# Patient Record
Sex: FEMALE | Race: WHITE | NOT HISPANIC OR LATINO | Employment: OTHER | URBAN - METROPOLITAN AREA
[De-identification: names, ages, dates, MRNs, and addresses within clinical notes are randomized per-mention and may not be internally consistent; named-entity substitution may affect disease eponyms.]

---

## 2018-04-22 ENCOUNTER — OFFICE VISIT (OUTPATIENT)
Dept: URGENT CARE | Facility: CLINIC | Age: 63
End: 2018-04-22
Payer: MEDICARE

## 2018-04-22 VITALS
HEIGHT: 67 IN | WEIGHT: 185 LBS | SYSTOLIC BLOOD PRESSURE: 114 MMHG | HEART RATE: 102 BPM | OXYGEN SATURATION: 94 % | RESPIRATION RATE: 18 BRPM | BODY MASS INDEX: 29.03 KG/M2 | TEMPERATURE: 99.2 F | DIASTOLIC BLOOD PRESSURE: 70 MMHG

## 2018-04-22 DIAGNOSIS — J40 BRONCHITIS: ICD-10-CM

## 2018-04-22 PROCEDURE — 99203 OFFICE O/P NEW LOW 30 MIN: CPT | Performed by: PHYSICIAN ASSISTANT

## 2018-04-22 RX ORDER — AZITHROMYCIN 250 MG/1
TABLET, FILM COATED ORAL
Qty: 6 TAB | Refills: 0 | Status: SHIPPED | OUTPATIENT
Start: 2018-04-22 | End: 2023-07-05

## 2018-04-22 RX ORDER — METHYLPREDNISOLONE 4 MG/1
TABLET ORAL
Qty: 1 KIT | Refills: 0 | Status: SHIPPED | OUTPATIENT
Start: 2018-04-22 | End: 2023-07-05

## 2018-04-22 ASSESSMENT — ENCOUNTER SYMPTOMS
SORE THROAT: 1
NECK PAIN: 0
DIARRHEA: 0
HEADACHES: 0
RHINORRHEA: 1
COUGH: 1

## 2018-04-22 ASSESSMENT — PAIN SCALES - GENERAL: PAINLEVEL: 8=MODERATE-SEVERE PAIN

## 2018-04-22 NOTE — PROGRESS NOTES
"Subjective:      Nadine Hurd is a 63 y.o. female who presents with Otalgia (x3 days. Pt complains of Lt ear pain, fever, post nasal drip, sinus congestion/pressure.)            Otalgia    There is pain in the left ear. This is a new problem. The current episode started yesterday. The problem occurs constantly. The problem has been gradually worsening. The maximum temperature recorded prior to her arrival was 101 - 101.9 F. The fever has been present for less than 1 day. The pain is at a severity of 5/10. The pain is moderate. Associated symptoms include coughing, rhinorrhea and a sore throat. Pertinent negatives include no diarrhea, ear discharge, headaches, hearing loss, neck pain or rash. She has tried nothing for the symptoms. The treatment provided no relief.     Past medical history: Is not pertinent to this examination  Past surgical history: Not pertinent to this examination  Family history: Is not pertinent to this examination  Allergies: No known drug allergies  Social history: Is reviewed in Epic      Review of Systems   HENT: Positive for ear pain, rhinorrhea and sore throat. Negative for ear discharge and hearing loss.    Respiratory: Positive for cough.    Gastrointestinal: Negative for diarrhea.   Musculoskeletal: Negative for neck pain.   Skin: Negative for rash.   Neurological: Negative for headaches.          Objective:     /70   Pulse (!) 102   Temp 37.3 °C (99.2 °F)   Resp 18   Ht 1.702 m (5' 7\")   Wt 83.9 kg (185 lb)   SpO2 94%   Breastfeeding? No   BMI 28.98 kg/m²      Physical Exam       Gen.: Patient is A and O ×3, no acute distress, well-nourished well-hydrated  Vitals: Are listed and unremarkable  HEENT: Heads normocephalic, atraumatic, PERRLA, extraocular movements intact, TMs and oropharynx clear  Neck: Soft supple without cervical lymphadenopathy  Cardiovascular: Regular rate and rhythm normal S1 and S2. No murmurs, rubs or gallops  Lungs are clear to auscultation " bilaterally. no wheezes rales or rhonchi. Mildly decreased breath sounds in the left lower lung  Abdomen is soft, nontender, nondistended with good bowel sounds, no hepatosplenomegaly  Skin: Is well perfused without evidence of rash or lesions  Neurological:  cranial nerves II through XII were assessed and intact.  Musculoskeletal: Full range of motion, 5 out of 5 strength against resistance  Neurovascularly: Intact with a 2 second cap refill, good distal pulses    Urgent care course: Offered albuterol breathing treatment. Patient defers     Assessment/Plan:     1. Bronchitis  Discussed likely viral etiology. Ears look good. Patient requesting Z-Maxwell. Discussed only fill prescription if symptoms persist or worsen despite treatment  - azithromycin (ZITHROMAX) 250 MG Tab; Take two tablets on day one, then on tablet the following four days  Dispense: 6 Tab; Refill: 0  - MethylPREDNISolone (MEDROL DOSEPAK) 4 MG Tablet Therapy Pack; Take daily according to directions on the packet  Dispense: 1 Kit; Refill: 0

## 2021-03-03 DIAGNOSIS — Z23 NEED FOR VACCINATION: ICD-10-CM

## 2021-09-10 ENCOUNTER — HOSPITAL ENCOUNTER (OUTPATIENT)
Dept: LAB | Facility: MEDICAL CENTER | Age: 66
End: 2021-09-10
Attending: GENERAL PRACTICE
Payer: MEDICARE

## 2021-09-10 LAB
25(OH)D3 SERPL-MCNC: 51 NG/ML (ref 30–100)
ALBUMIN SERPL BCP-MCNC: 4 G/DL (ref 3.2–4.9)
ALBUMIN/GLOB SERPL: 1.4 G/DL
ALP SERPL-CCNC: 78 U/L (ref 30–99)
ALT SERPL-CCNC: 18 U/L (ref 2–50)
AMORPH CRY #/AREA URNS HPF: PRESENT /HPF
ANION GAP SERPL CALC-SCNC: 10 MMOL/L (ref 7–16)
APPEARANCE UR: ABNORMAL
AST SERPL-CCNC: 24 U/L (ref 12–45)
BACTERIA #/AREA URNS HPF: NEGATIVE /HPF
BASOPHILS # BLD AUTO: 0.8 % (ref 0–1.8)
BASOPHILS # BLD: 0.05 K/UL (ref 0–0.12)
BILIRUB SERPL-MCNC: 0.7 MG/DL (ref 0.1–1.5)
BILIRUB UR QL STRIP.AUTO: NEGATIVE
BUN SERPL-MCNC: 12 MG/DL (ref 8–22)
CALCIUM SERPL-MCNC: 9.1 MG/DL (ref 8.5–10.5)
CHLORIDE SERPL-SCNC: 108 MMOL/L (ref 96–112)
CHOLEST SERPL-MCNC: 177 MG/DL (ref 100–199)
CO2 SERPL-SCNC: 25 MMOL/L (ref 20–33)
COLOR UR: YELLOW
CREAT SERPL-MCNC: 0.68 MG/DL (ref 0.5–1.4)
CRP SERPL HS-MCNC: <0.3 MG/DL (ref 0–0.75)
EOSINOPHIL # BLD AUTO: 0.24 K/UL (ref 0–0.51)
EOSINOPHIL NFR BLD: 3.9 % (ref 0–6.9)
EPI CELLS #/AREA URNS HPF: NEGATIVE /HPF
ERYTHROCYTE [DISTWIDTH] IN BLOOD BY AUTOMATED COUNT: 46.3 FL (ref 35.9–50)
EST. AVERAGE GLUCOSE BLD GHB EST-MCNC: 108 MG/DL
FASTING STATUS PATIENT QL REPORTED: NORMAL
GLOBULIN SER CALC-MCNC: 2.8 G/DL (ref 1.9–3.5)
GLUCOSE SERPL-MCNC: 90 MG/DL (ref 65–99)
GLUCOSE UR STRIP.AUTO-MCNC: NEGATIVE MG/DL
HBA1C MFR BLD: 5.4 % (ref 4–5.6)
HCT VFR BLD AUTO: 45.2 % (ref 37–47)
HDLC SERPL-MCNC: 58 MG/DL
HGB BLD-MCNC: 14.8 G/DL (ref 12–16)
HYALINE CASTS #/AREA URNS LPF: NORMAL /LPF
IMM GRANULOCYTES # BLD AUTO: 0.02 K/UL (ref 0–0.11)
IMM GRANULOCYTES NFR BLD AUTO: 0.3 % (ref 0–0.9)
IRON SATN MFR SERPL: 46 % (ref 15–55)
IRON SERPL-MCNC: 109 UG/DL (ref 40–170)
KETONES UR STRIP.AUTO-MCNC: NEGATIVE MG/DL
LDLC SERPL CALC-MCNC: 97 MG/DL
LEUKOCYTE ESTERASE UR QL STRIP.AUTO: NEGATIVE
LYMPHOCYTES # BLD AUTO: 1.73 K/UL (ref 1–4.8)
LYMPHOCYTES NFR BLD: 28.1 % (ref 22–41)
MAGNESIUM SERPL-MCNC: 2.1 MG/DL (ref 1.5–2.5)
MCH RBC QN AUTO: 31.1 PG (ref 27–33)
MCHC RBC AUTO-ENTMCNC: 32.7 G/DL (ref 33.6–35)
MCV RBC AUTO: 95 FL (ref 81.4–97.8)
MICRO URNS: ABNORMAL
MONOCYTES # BLD AUTO: 0.39 K/UL (ref 0–0.85)
MONOCYTES NFR BLD AUTO: 6.3 % (ref 0–13.4)
NEUTROPHILS # BLD AUTO: 3.72 K/UL (ref 2–7.15)
NEUTROPHILS NFR BLD: 60.6 % (ref 44–72)
NITRITE UR QL STRIP.AUTO: NEGATIVE
NRBC # BLD AUTO: 0 K/UL
NRBC BLD-RTO: 0 /100 WBC
PH UR STRIP.AUTO: 8 [PH] (ref 5–8)
PLATELET # BLD AUTO: 244 K/UL (ref 164–446)
PMV BLD AUTO: 10.2 FL (ref 9–12.9)
POTASSIUM SERPL-SCNC: 4.2 MMOL/L (ref 3.6–5.5)
PROT SERPL-MCNC: 6.8 G/DL (ref 6–8.2)
PROT UR QL STRIP: NEGATIVE MG/DL
RBC # BLD AUTO: 4.76 M/UL (ref 4.2–5.4)
RBC # URNS HPF: NORMAL /HPF
RBC UR QL AUTO: NEGATIVE
RENAL EPI CELLS #/AREA URNS HPF: NORMAL /HPF
SODIUM SERPL-SCNC: 143 MMOL/L (ref 135–145)
SP GR UR STRIP.AUTO: 1.02
T3 SERPL-MCNC: 120 NG/DL (ref 60–181)
T3FREE SERPL-MCNC: 2.76 PG/ML (ref 2–4.4)
T4 SERPL-MCNC: 8.8 UG/DL (ref 4–12)
TIBC SERPL-MCNC: 237 UG/DL (ref 250–450)
TRIGL SERPL-MCNC: 112 MG/DL (ref 0–149)
TSH SERPL DL<=0.005 MIU/L-ACNC: 1.09 UIU/ML (ref 0.38–5.33)
UIBC SERPL-MCNC: 128 UG/DL (ref 110–370)
UROBILINOGEN UR STRIP.AUTO-MCNC: 0.2 MG/DL
WBC # BLD AUTO: 6.2 K/UL (ref 4.8–10.8)
WBC #/AREA URNS HPF: NORMAL /HPF

## 2021-09-10 PROCEDURE — 81001 URINALYSIS AUTO W/SCOPE: CPT

## 2021-09-10 PROCEDURE — 85025 COMPLETE CBC W/AUTO DIFF WBC: CPT

## 2021-09-10 PROCEDURE — 84480 ASSAY TRIIODOTHYRONINE (T3): CPT

## 2021-09-10 PROCEDURE — 36415 COLL VENOUS BLD VENIPUNCTURE: CPT

## 2021-09-10 PROCEDURE — 84443 ASSAY THYROID STIM HORMONE: CPT

## 2021-09-10 PROCEDURE — 83735 ASSAY OF MAGNESIUM: CPT

## 2021-09-10 PROCEDURE — 82306 VITAMIN D 25 HYDROXY: CPT

## 2021-09-10 PROCEDURE — 80061 LIPID PANEL: CPT

## 2021-09-10 PROCEDURE — 84439 ASSAY OF FREE THYROXINE: CPT

## 2021-09-10 PROCEDURE — 84436 ASSAY OF TOTAL THYROXINE: CPT

## 2021-09-10 PROCEDURE — 86140 C-REACTIVE PROTEIN: CPT

## 2021-09-10 PROCEDURE — 80053 COMPREHEN METABOLIC PANEL: CPT

## 2021-09-10 PROCEDURE — 83036 HEMOGLOBIN GLYCOSYLATED A1C: CPT | Mod: GA

## 2021-09-10 PROCEDURE — 84481 FREE ASSAY (FT-3): CPT

## 2021-09-10 PROCEDURE — 83540 ASSAY OF IRON: CPT

## 2021-09-10 PROCEDURE — 83550 IRON BINDING TEST: CPT

## 2021-09-14 LAB — T4 FREE SERPL DIALY-MCNC: 2.1 NG/DL (ref 1.1–2.4)

## 2023-06-02 ENCOUNTER — HOSPITAL ENCOUNTER (OUTPATIENT)
Dept: RADIOLOGY | Facility: MEDICAL CENTER | Age: 68
End: 2023-06-02
Attending: STUDENT IN AN ORGANIZED HEALTH CARE EDUCATION/TRAINING PROGRAM
Payer: COMMERCIAL

## 2023-06-02 ENCOUNTER — HOSPITAL ENCOUNTER (OUTPATIENT)
Dept: RADIOLOGY | Facility: MEDICAL CENTER | Age: 68
End: 2023-06-02
Payer: COMMERCIAL

## 2023-06-02 ENCOUNTER — HOSPITAL ENCOUNTER (OUTPATIENT)
Dept: RADIOLOGY | Facility: MEDICAL CENTER | Age: 68
End: 2023-06-02
Attending: FAMILY MEDICINE
Payer: COMMERCIAL

## 2023-06-09 ENCOUNTER — APPOINTMENT (OUTPATIENT)
Dept: RADIOLOGY | Facility: MEDICAL CENTER | Age: 68
End: 2023-06-09
Attending: SURGERY
Payer: MEDICARE

## 2023-06-09 DIAGNOSIS — N63.10 MASS OF RIGHT BREAST, UNSPECIFIED QUADRANT: ICD-10-CM

## 2023-06-09 PROCEDURE — A9579 GAD-BASE MR CONTRAST NOS,1ML: HCPCS | Performed by: SURGERY

## 2023-06-09 PROCEDURE — 700117 HCHG RX CONTRAST REV CODE 255: Performed by: SURGERY

## 2023-06-09 PROCEDURE — C8908 MRI W/O FOL W/CONT, BREAST,: HCPCS

## 2023-06-09 RX ADMIN — GADOTERIDOL 15 ML: 279.3 INJECTION, SOLUTION INTRAVENOUS at 13:28

## 2023-06-21 ENCOUNTER — HOSPITAL ENCOUNTER (OUTPATIENT)
Facility: MEDICAL CENTER | Age: 68
End: 2023-06-21
Attending: SURGERY | Admitting: SURGERY
Payer: MEDICARE

## 2023-06-23 ENCOUNTER — APPOINTMENT (OUTPATIENT)
Dept: ADMISSIONS | Facility: MEDICAL CENTER | Age: 68
End: 2023-06-23
Attending: SURGERY
Payer: MEDICARE

## 2023-07-05 ENCOUNTER — PRE-ADMISSION TESTING (OUTPATIENT)
Dept: ADMISSIONS | Facility: MEDICAL CENTER | Age: 68
End: 2023-07-05
Attending: SURGERY
Payer: MEDICARE

## 2023-07-05 DIAGNOSIS — Z01.812 PRE-OPERATIVE LABORATORY EXAMINATION: ICD-10-CM

## 2023-07-05 RX ORDER — LEVOTHYROXINE SODIUM 0.07 MG/1
1 TABLET ORAL DAILY
COMMUNITY

## 2023-07-05 RX ORDER — MULTIVIT WITH MINERALS/LUTEIN
1 TABLET ORAL DAILY
COMMUNITY
End: 2023-12-26

## 2023-07-05 NOTE — OR NURSING
During RN telephone preadmission appointment, Nadine Hurd stated that she is Jehovah Witness and requests to be bloodless. I called Holly,  for Dr. Bello and left her a telephone voice message that Nadine requests to be bloodless. I also called and spoke with [facility] surgery scheduler Carolee Man and notified her that Nadine is bloodless. I scheduled Nadine for a testing appointment on 7/11/23 for a CBC and EKG and she will be given a consent to review for the bloodless program prior to surgery.

## 2023-10-04 ENCOUNTER — OFFICE VISIT (OUTPATIENT)
Dept: SURGERY | Facility: MEDICAL CENTER | Age: 68
End: 2023-10-04
Payer: MEDICARE

## 2023-10-04 DIAGNOSIS — N60.91 ATYPICAL LOBULAR HYPERPLASIA (ALH) OF RIGHT BREAST: ICD-10-CM

## 2023-10-04 DIAGNOSIS — D48.61 NEOPLASM OF UNCERTAIN BEHAVIOR OF RIGHT BREAST: ICD-10-CM

## 2023-10-04 DIAGNOSIS — Z80.3 FAMILY HISTORY OF MALIGNANT NEOPLASM OF BREAST: ICD-10-CM

## 2023-10-04 PROCEDURE — 99203 OFFICE O/P NEW LOW 30 MIN: CPT | Performed by: SURGERY

## 2023-10-04 RX ORDER — ALPRAZOLAM 0.5 MG/1
TABLET ORAL
COMMUNITY
End: 2023-10-04

## 2023-10-04 RX ORDER — DIAZEPAM 2 MG/1
TABLET ORAL
COMMUNITY
Start: 2023-07-13 | End: 2023-10-04

## 2023-10-04 RX ORDER — DOXYCYCLINE HYCLATE 100 MG/1
CAPSULE ORAL
COMMUNITY
End: 2023-10-04

## 2023-10-04 RX ORDER — CIPROFLOXACIN 250 MG/1
TABLET, FILM COATED ORAL
COMMUNITY
End: 2023-10-04

## 2023-10-04 RX ORDER — HYDROXYCHLOROQUINE SULFATE 200 MG/1
TABLET, FILM COATED ORAL
COMMUNITY
End: 2023-11-27

## 2023-10-04 NOTE — PROGRESS NOTES
Subjective:   10/4/2023  6:11 AM  Primary care provider:  Dru Conroy M.D.    Chief Complaint:   Chief Complaint   Patient presents with    New Patient       History of presenting illness:  This pleasant 68 y.o. year old female is kindly referred for consultation regarding a RIGHT breast mass and biopsy showing atypical lobular hyperplasia.  She noticed a right breast mass in December, 2022.  This apparently waxes and wanes, but has not definitely enlarged.  No nipple discharge or other changes on breast exam.  She reports some achiness of the breast.  She had a previous LEFT breast excisional biopsy done by Dr. Hart in his office 23 years ago.  She remembers being told it was benign but precancerous.      She explained to me the journey she has been on with workup of this breast mass.  She reports undergoing extensive biopsies of the right breast mass in Dr. Bello's office and recalls being told it was again benign but precancerous, and was advised to undergo further surgery.  She was actually scheduled for this, but then she developed strep throat which led to cancellation of the procedure.  On further reflection, she realized that she did not feel as connected with her surgeon as she desired, and wondered if there was hesitation on his part after discussing her firm decision to avoid blood products (Quaker).  She felt that perhaps a female surgeon might ease her concerns.    I reviewed the available records, and per documentation, pt had reported palpitations and near syncopal event with local anesthesia for a dental procedure, so percutaneous biopsy by Radiology was declined.  This led to the referral for surgical evaluation by Dr. Bello and 0.5% Marcaine with epinephrine was used which was felt to be tolerated reasonably well.      The patient is accompanied by her  Johan.      LABS:  Lab Results   Component Value Date/Time    HBA1C 5.4 09/10/2021 12:31 PM        IMAGING:  Screening  mammo 2/15/23 RDC:   Scattered FGD.  Last prior mammo 2016.   RIGHT upper outer focal asymmetry.    Right diagnostic mammo 4/6/23:   10:00 focal AD with corresponding sonographic mass.    Right breast US:   10:00, 5cm FN:  Hypoechoic well defined area with posterior shadowing corresponding to mammo AD.  2.8 x 3.9 x 1.3cm.     No axillary LAD.    Core biopsies done by Dr. Bello 4/25/23:  Focal atypical lobular hyperplasia ([facility])       MRI:  MR-BREAST-BILATERAL-WITH & W/O 06/09/2023    Narrative  6/9/2023 11:34 AM    HISTORY/REASON FOR EXAM:  New architectural distortion in the upper outer quadrant of the right breast with apparent ultrasound correlate felt to be highly suspicious for breast cancer. Patient reportedly underwent ultrasound-guided biopsy by Dr. Bello with pathology reportedly showing atypical lobular hyperplasia.      TECHNIQUE/EXAM DESCRIPTION:  MRI of the breast, bilateral without and with dynamic IV gadolinium enhancement.    MR imaging of the bilateral breasts was performed on a Demetra 3.0 Blaire scanner using a dedicated breast coil with the patient in the prone position, with axial T1, axial fat-suppressed T2, and bolus dynamic intravenous gadolinium enhanced fat-suppressed 3D GRE sequences at precontrast, 30 second, 2 minute, 3 minute and 5 minute delays. Post processing subtraction images were obtained. Delayed postcontrast sagittal images also obtained. 15 mL ProHance contrast was administered intravenously.    COMPARISON:  Mammogram and ultrasound April 6, 2023, mammogram February 15, 2023 and mammogram June 7, 2016    FINDINGS:  Background parenchymal enhancement is mild and symmetric.    Right breast: There are scattered fibroglandular densities. No obvious cysts identified. No lymphadenopathy identified. There is an area of architectural distortion/ill-defined asymmetry in the upper-outer quadrant of the right breast posterior third corresponding with mammographic  findings. This measures approximately 15 x 14 x 16 mm. There is minimal associated enhancement. No other abnormality identified.    Left breast: There are scattered fibroglandular densities noted. No lymphadenopathy identified. There is no active enhancing mass or abnormal area of enhancement.    Upper abdomen: Apparent small to moderate-sized hiatal hernia.    Bony structures: Unremarkable    Impression  1.  Architectural distortion with minimal associated enhancement identified in the upper-outer quadrant of the right breast corresponding with mammographic findings. Differential diagnosis includes radial scar and carcinoma. If excision is not planned, then would recommend stereotactic core biopsy to ensure adequate sampling of the area which was originally identified on mammography and better visualized on mammography.    2.  No MRI evidence of abnormality in the left breast.    R4C - CATEGORY 4C: SUSPICIOUS FINDING - FINDING NEEDING INTERVENTION WITH A MODERATE CONCERN BUT NOT CLASSIC FOR MALIGNANCY      Allergies   Allergen Reactions    Other Environmental Anaphylaxis     Bees    Bloodless Unspecified     Jehovah witness    Ceclor [Cefaclor] Anaphylaxis    Clindamycin Anaphylaxis    Codeine Nausea     Nausea and vomiting    Epinephrine Unspecified     tachycardia    Food Dye Swelling     Throat swells    Penicillins Itching    Zinc Swelling       Current Outpatient Medications   Medication Sig    levothyroxine (EUTHYROX) 75 MCG Tab Take 1 Tablet by mouth every day.    Cholecalciferol (VITAMIN D3 PO) Take 5,000 Int'l Units by mouth every day.    Ascorbic Acid (VITAMIN C) 1000 MG Tab Take 1 Tablet by mouth every day.    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     Past Surgical History:   Procedure Laterality Date    COLONOSCOPY  "     LUMPECTOMY Left     OTHER      Right salivary tumor removal    OVARIAN CYSTECTOMY      Laparoscopic    TONSILLECTOMY         Social History     Tobacco Use    Smoking status: Never     Passive exposure: Past    Smokeless tobacco: Never   Vaping Use    Vaping Use: Never used   Substance Use Topics    Alcohol use: Never    Drug use: Never     Family and Occupational History     Socioeconomic History    Marital status:      Spouse name: Not on file    Number of children: Not on file    Years of education: Not on file    Highest education level: Not on file   Occupational History    Not on file      OB History    Para Term  AB Living   4 1 0 0 3 0   SAB IAB Ectopic Molar Multiple Live Births   0 0 0 0 0 0   Obstetric Comments   Age of first delivery: 40       Family History   Problem Relation Age of Onset    Colon Cancer Mother     Breast Cancer Paternal Aunt     Colon Cancer Maternal Grandmother     Rectal Cancer Maternal Grandfather     Uterine cancer Maternal Cousin     Uterine cancer Maternal Cousin        Review of Systems   HENT:  Positive for congestion.    Eyes:         Nearsighted.  Cataracts.   Cardiovascular:         Mitral valve prolapse   Gastrointestinal:         Hiatal hernia   Skin:         Right breast lump   All other systems reviewed and are negative.         Objective:   /82 (BP Location: Left arm, Patient Position: Sitting, BP Cuff Size: Large adult)   Pulse 62   Temp 36.3 °C (97.3 °F) (Temporal)   Ht 1.702 m (5' 7\")   Wt 83.8 kg (184 lb 12.8 oz)   LMP 10/05/2013 (Approximate) Comment: Age of first period: 9  SpO2 94%   BMI 28.94 kg/m²       Physical Exam  Constitutional:       Appearance: Normal appearance.   Cardiovascular:      Rate and Rhythm: Regular rhythm.      Heart sounds: Normal heart sounds.   Pulmonary:      Effort: Pulmonary effort is normal.      Breath sounds: Normal breath sounds.   Skin:     Comments: See scanned breast diagram   Neurological: "      Mental Status: She is alert.   Psychiatric:         Mood and Affect: Mood normal.      Comments: Anxiety related to medical condition             Diagnosis:     1. 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 Bello's office).    2cm on exam.  Vague sonographic finding measured to be almost 4cm.  Mammo shows a discrete asymmetry/AD 1.6cm.      Screening mammo 2/15/23 RDC:  Scattered FGD.     Paternal aunt with breast cancer (50).  Other FH for colon and rectal cancer, uterine cancer, brother with brain tumor.  Refer to Genetics.      No smoking or alcohol history.  BMI 29.  [employer].  Refuses blood products.      DISCUSSED:  I personally reviewed and independently interpreted her breast imaging including mammograms and US, available pathology reports, and relevant outside records.  I explained that historically, we have excised lesions such as these due to reported upgrade rates to malignancy, which can require additional surgery or other treatment.   I discussed the significance of ALH and how this can typically be simply monitored in certain settings.  However, this mass is palpable and moderately suspicious based on imaging and exam.  I therefore agree with excision.  We discussed the potential surgical risks including bleeding, infection, and visible scars and/or other aesthetic changes.  To limit her risk for bleeding, we are in agreement to simply make a direct incision over the palpable mass, as she had done on the left breast.  She assures me she is unconcerned about cosmetic outcome or asymmetry.  I also explained the importance of compression for 2-3 weeks after surgery and advised her to obtain some snug-fitting compression/sports bras to be worn immediately after surgery.        I advised  consultation with Genetics.  We discussed the role of more comprehensive genetic testing focused on cancer related gene mutations, and I advised consultation with genetic counselor along with post-test counseling to ensure she has adequate understanding of her test results and/or screening options.  She may qualify for high risk screening.       Total time spent today, including personal review and independent interpretation of available breast imaging and pathology reports, extensive review of outside records, face to face time (25 min for this alone), chart documentation, and  was 40 minutes.            PLAN:  Excisional biopsy of RIGHT breast mass.    Referral to Genetics

## 2023-10-05 VITALS
WEIGHT: 184.8 LBS | TEMPERATURE: 97.3 F | HEART RATE: 62 BPM | HEIGHT: 67 IN | BODY MASS INDEX: 29 KG/M2 | SYSTOLIC BLOOD PRESSURE: 128 MMHG | OXYGEN SATURATION: 94 % | DIASTOLIC BLOOD PRESSURE: 82 MMHG

## 2023-10-05 PROBLEM — D48.61 NEOPLASM OF UNCERTAIN BEHAVIOR OF RIGHT BREAST: Status: ACTIVE | Noted: 2023-10-05

## 2023-10-05 ASSESSMENT — PATIENT HEALTH QUESTIONNAIRE - PHQ9: CLINICAL INTERPRETATION OF PHQ2 SCORE: 0

## 2023-10-05 ASSESSMENT — ENCOUNTER SYMPTOMS
ROS GI COMMENTS: HIATAL HERNIA
ROS SKIN COMMENTS: RIGHT BREAST LUMP

## 2023-10-10 ENCOUNTER — DOCUMENTATION (OUTPATIENT)
Dept: SURGERY | Facility: MEDICAL CENTER | Age: 68
End: 2023-10-10
Payer: COMMERCIAL

## 2023-10-10 NOTE — PROGRESS NOTES
NO AUTH REQUIRED FOR CODE [id number] 11-2-23 SURGERY W/DR. MOREIRA. MEDICARE/[facility]

## 2023-10-12 ENCOUNTER — APPOINTMENT (OUTPATIENT)
Dept: ADMISSIONS | Facility: MEDICAL CENTER | Age: 68
End: 2023-10-12
Attending: OBSTETRICS & GYNECOLOGY
Payer: MEDICARE

## 2023-10-18 ENCOUNTER — APPOINTMENT (OUTPATIENT)
Dept: ADMISSIONS | Facility: MEDICAL CENTER | Age: 68
End: 2023-10-18
Attending: SURGERY
Payer: MEDICARE

## 2023-10-30 RX ORDER — ALPRAZOLAM 0.25 MG/1
.25-.5 TABLET ORAL
Status: CANCELLED | OUTPATIENT
Start: 2023-11-02 | End: 2023-11-03

## 2023-11-13 ENCOUNTER — APPOINTMENT (OUTPATIENT)
Dept: RADIOLOGY | Facility: MEDICAL CENTER | Age: 68
End: 2023-11-13
Attending: GENERAL PRACTICE
Payer: MEDICARE

## 2023-11-20 ENCOUNTER — PRE-ADMISSION TESTING (OUTPATIENT)
Dept: ADMISSIONS | Facility: MEDICAL CENTER | Age: 68
End: 2023-11-20
Attending: SURGERY
Payer: MEDICARE

## 2023-11-27 ENCOUNTER — PRE-ADMISSION TESTING (OUTPATIENT)
Dept: ADMISSIONS | Facility: MEDICAL CENTER | Age: 68
End: 2023-11-27
Payer: MEDICARE

## 2023-11-27 DIAGNOSIS — Z01.812 PRE-OPERATIVE LABORATORY EXAMINATION: ICD-10-CM

## 2023-11-28 ENCOUNTER — ANESTHESIA EVENT (OUTPATIENT)
Dept: SURGERY | Facility: MEDICAL CENTER | Age: 68
End: 2023-11-28
Payer: MEDICARE

## 2023-11-28 ENCOUNTER — ANESTHESIA (OUTPATIENT)
Dept: SURGERY | Facility: MEDICAL CENTER | Age: 68
End: 2023-11-28
Payer: MEDICARE

## 2023-11-28 ENCOUNTER — HOSPITAL ENCOUNTER (OUTPATIENT)
Facility: MEDICAL CENTER | Age: 68
End: 2023-11-28
Attending: SURGERY | Admitting: SURGERY
Payer: MEDICARE

## 2023-11-28 ENCOUNTER — APPOINTMENT (OUTPATIENT)
Dept: RADIOLOGY | Facility: MEDICAL CENTER | Age: 68
End: 2023-11-28
Attending: SURGERY
Payer: MEDICARE

## 2023-11-28 VITALS
OXYGEN SATURATION: 93 % | TEMPERATURE: 97.5 F | DIASTOLIC BLOOD PRESSURE: 65 MMHG | HEIGHT: 67 IN | SYSTOLIC BLOOD PRESSURE: 116 MMHG | BODY MASS INDEX: 29.1 KG/M2 | WEIGHT: 185.41 LBS | RESPIRATION RATE: 20 BRPM | HEART RATE: 81 BPM

## 2023-11-28 DIAGNOSIS — G89.18 POSTOPERATIVE PAIN: ICD-10-CM

## 2023-11-28 DIAGNOSIS — N63.10 MASS OF RIGHT BREAST, UNSPECIFIED QUADRANT: ICD-10-CM

## 2023-11-28 LAB
BASOPHILS # BLD AUTO: 0.8 % (ref 0–1.8)
BASOPHILS # BLD: 0.06 K/UL (ref 0–0.12)
EOSINOPHIL # BLD AUTO: 0.11 K/UL (ref 0–0.51)
EOSINOPHIL NFR BLD: 1.5 % (ref 0–6.9)
ERYTHROCYTE [DISTWIDTH] IN BLOOD BY AUTOMATED COUNT: 43.1 FL (ref 35.9–50)
HCT VFR BLD AUTO: 47.3 % (ref 37–47)
HGB BLD-MCNC: 15.6 G/DL (ref 12–16)
IMM GRANULOCYTES # BLD AUTO: 0.01 K/UL (ref 0–0.11)
IMM GRANULOCYTES NFR BLD AUTO: 0.1 % (ref 0–0.9)
LYMPHOCYTES # BLD AUTO: 1.64 K/UL (ref 1–4.8)
LYMPHOCYTES NFR BLD: 22.1 % (ref 22–41)
MCH RBC QN AUTO: 30.4 PG (ref 27–33)
MCHC RBC AUTO-ENTMCNC: 33 G/DL (ref 32.2–35.5)
MCV RBC AUTO: 92.2 FL (ref 81.4–97.8)
MONOCYTES # BLD AUTO: 0.46 K/UL (ref 0–0.85)
MONOCYTES NFR BLD AUTO: 6.2 % (ref 0–13.4)
NEUTROPHILS # BLD AUTO: 5.13 K/UL (ref 1.82–7.42)
NEUTROPHILS NFR BLD: 69.3 % (ref 44–72)
NRBC # BLD AUTO: 0 K/UL
NRBC BLD-RTO: 0 /100 WBC (ref 0–0.2)
PATHOLOGY CONSULT NOTE: NORMAL
PLATELET # BLD AUTO: 280 K/UL (ref 164–446)
PMV BLD AUTO: 9.7 FL (ref 9–12.9)
RBC # BLD AUTO: 5.13 M/UL (ref 4.2–5.4)
WBC # BLD AUTO: 7.4 K/UL (ref 4.8–10.8)

## 2023-11-28 PROCEDURE — 160028 HCHG SURGERY MINUTES - 1ST 30 MINS LEVEL 3: Performed by: SURGERY

## 2023-11-28 PROCEDURE — 700105 HCHG RX REV CODE 258: Performed by: ANESTHESIOLOGY

## 2023-11-28 PROCEDURE — 700111 HCHG RX REV CODE 636 W/ 250 OVERRIDE (IP): Mod: JZ | Performed by: ANESTHESIOLOGY

## 2023-11-28 PROCEDURE — 160039 HCHG SURGERY MINUTES - EA ADDL 1 MIN LEVEL 3: Performed by: SURGERY

## 2023-11-28 PROCEDURE — 700101 HCHG RX REV CODE 250: Performed by: ANESTHESIOLOGY

## 2023-11-28 PROCEDURE — A9270 NON-COVERED ITEM OR SERVICE: HCPCS | Performed by: ANESTHESIOLOGY

## 2023-11-28 PROCEDURE — 93005 ELECTROCARDIOGRAM TRACING: CPT | Performed by: SURGERY

## 2023-11-28 PROCEDURE — 160009 HCHG ANES TIME/MIN: Performed by: SURGERY

## 2023-11-28 PROCEDURE — 160035 HCHG PACU - 1ST 60 MINS PHASE I: Performed by: SURGERY

## 2023-11-28 PROCEDURE — 36415 COLL VENOUS BLD VENIPUNCTURE: CPT

## 2023-11-28 PROCEDURE — 160046 HCHG PACU - 1ST 60 MINS PHASE II: Performed by: SURGERY

## 2023-11-28 PROCEDURE — 700111 HCHG RX REV CODE 636 W/ 250 OVERRIDE (IP): Performed by: ANESTHESIOLOGY

## 2023-11-28 PROCEDURE — 700102 HCHG RX REV CODE 250 W/ 637 OVERRIDE(OP): Performed by: ANESTHESIOLOGY

## 2023-11-28 PROCEDURE — 88341 IMHCHEM/IMCYTCHM EA ADD ANTB: CPT | Mod: 91

## 2023-11-28 PROCEDURE — 88361 TUMOR IMMUNOHISTOCHEM/COMPUT: CPT

## 2023-11-28 PROCEDURE — 88342 IMHCHEM/IMCYTCHM 1ST ANTB: CPT

## 2023-11-28 PROCEDURE — 160048 HCHG OR STATISTICAL LEVEL 1-5: Performed by: SURGERY

## 2023-11-28 PROCEDURE — 88360 TUMOR IMMUNOHISTOCHEM/MANUAL: CPT | Mod: 91

## 2023-11-28 PROCEDURE — 76098 X-RAY EXAM SURGICAL SPECIMEN: CPT | Mod: RT

## 2023-11-28 PROCEDURE — 88307 TISSUE EXAM BY PATHOLOGIST: CPT

## 2023-11-28 PROCEDURE — 85025 COMPLETE CBC W/AUTO DIFF WBC: CPT

## 2023-11-28 PROCEDURE — 160002 HCHG RECOVERY MINUTES (STAT): Performed by: SURGERY

## 2023-11-28 PROCEDURE — 160047 HCHG PACU  - EA ADDL 30 MINS PHASE II: Performed by: SURGERY

## 2023-11-28 PROCEDURE — 160025 RECOVERY II MINUTES (STATS): Performed by: SURGERY

## 2023-11-28 PROCEDURE — 19120 REMOVAL OF BREAST LESION: CPT | Mod: RT | Performed by: SURGERY

## 2023-11-28 PROCEDURE — 700101 HCHG RX REV CODE 250: Performed by: SURGERY

## 2023-11-28 RX ORDER — ONDANSETRON 2 MG/ML
INJECTION INTRAMUSCULAR; INTRAVENOUS PRN
Status: DISCONTINUED | OUTPATIENT
Start: 2023-11-28 | End: 2023-11-28 | Stop reason: SURG

## 2023-11-28 RX ORDER — ONDANSETRON 2 MG/ML
4 INJECTION INTRAMUSCULAR; INTRAVENOUS
Status: COMPLETED | OUTPATIENT
Start: 2023-11-28 | End: 2023-11-28

## 2023-11-28 RX ORDER — TRAMADOL HYDROCHLORIDE 50 MG/1
50 TABLET ORAL EVERY 6 HOURS PRN
Qty: 8 TABLET | Refills: 0 | Status: SHIPPED | OUTPATIENT
Start: 2023-11-28 | End: 2023-11-30

## 2023-11-28 RX ORDER — LIDOCAINE HYDROCHLORIDE 20 MG/ML
INJECTION, SOLUTION EPIDURAL; INFILTRATION; INTRACAUDAL; PERINEURAL PRN
Status: DISCONTINUED | OUTPATIENT
Start: 2023-11-28 | End: 2023-11-28 | Stop reason: HOSPADM

## 2023-11-28 RX ORDER — EPHEDRINE SULFATE 50 MG/ML
INJECTION, SOLUTION INTRAVENOUS PRN
Status: DISCONTINUED | OUTPATIENT
Start: 2023-11-28 | End: 2023-11-28 | Stop reason: SURG

## 2023-11-28 RX ORDER — SODIUM CHLORIDE, SODIUM LACTATE, POTASSIUM CHLORIDE, CALCIUM CHLORIDE 600; 310; 30; 20 MG/100ML; MG/100ML; MG/100ML; MG/100ML
INJECTION, SOLUTION INTRAVENOUS
Status: DISCONTINUED | OUTPATIENT
Start: 2023-11-28 | End: 2023-11-28 | Stop reason: HOSPADM

## 2023-11-28 RX ORDER — HALOPERIDOL 5 MG/ML
1 INJECTION INTRAMUSCULAR
Status: DISCONTINUED | OUTPATIENT
Start: 2023-11-28 | End: 2023-11-28 | Stop reason: HOSPADM

## 2023-11-28 RX ORDER — SODIUM CHLORIDE, SODIUM LACTATE, POTASSIUM CHLORIDE, CALCIUM CHLORIDE 600; 310; 30; 20 MG/100ML; MG/100ML; MG/100ML; MG/100ML
INJECTION, SOLUTION INTRAVENOUS CONTINUOUS
Status: DISCONTINUED | OUTPATIENT
Start: 2023-11-28 | End: 2023-11-28 | Stop reason: HOSPADM

## 2023-11-28 RX ORDER — OXYCODONE HCL 5 MG/5 ML
10 SOLUTION, ORAL ORAL
Status: DISCONTINUED | OUTPATIENT
Start: 2023-11-28 | End: 2023-11-28 | Stop reason: HOSPADM

## 2023-11-28 RX ORDER — ACETAMINOPHEN 500 MG
1000 TABLET ORAL ONCE
Status: COMPLETED | OUTPATIENT
Start: 2023-11-28 | End: 2023-11-28

## 2023-11-28 RX ORDER — HYDROMORPHONE HYDROCHLORIDE 1 MG/ML
0.4 INJECTION, SOLUTION INTRAMUSCULAR; INTRAVENOUS; SUBCUTANEOUS
Status: DISCONTINUED | OUTPATIENT
Start: 2023-11-28 | End: 2023-11-28 | Stop reason: HOSPADM

## 2023-11-28 RX ORDER — DEXAMETHASONE SODIUM PHOSPHATE 4 MG/ML
INJECTION, SOLUTION INTRA-ARTICULAR; INTRALESIONAL; INTRAMUSCULAR; INTRAVENOUS; SOFT TISSUE PRN
Status: DISCONTINUED | OUTPATIENT
Start: 2023-11-28 | End: 2023-11-28 | Stop reason: HOSPADM

## 2023-11-28 RX ORDER — BUPIVACAINE HYDROCHLORIDE AND EPINEPHRINE 5; 5 MG/ML; UG/ML
INJECTION, SOLUTION EPIDURAL; INTRACAUDAL; PERINEURAL
Status: DISCONTINUED | OUTPATIENT
Start: 2023-11-28 | End: 2023-11-28 | Stop reason: HOSPADM

## 2023-11-28 RX ORDER — ALBUTEROL SULFATE 2.5 MG/3ML
2.5 SOLUTION RESPIRATORY (INHALATION)
Status: DISCONTINUED | OUTPATIENT
Start: 2023-11-28 | End: 2023-11-28 | Stop reason: HOSPADM

## 2023-11-28 RX ORDER — BUPIVACAINE HYDROCHLORIDE AND EPINEPHRINE 5; 5 MG/ML; UG/ML
INJECTION, SOLUTION EPIDURAL; INTRACAUDAL; PERINEURAL
Status: DISCONTINUED
Start: 2023-11-28 | End: 2023-11-28 | Stop reason: HOSPADM

## 2023-11-28 RX ORDER — CEFAZOLIN SODIUM 1 G/3ML
INJECTION, POWDER, FOR SOLUTION INTRAMUSCULAR; INTRAVENOUS PRN
Status: DISCONTINUED | OUTPATIENT
Start: 2023-11-28 | End: 2023-11-28 | Stop reason: SURG

## 2023-11-28 RX ORDER — OXYCODONE HCL 5 MG/5 ML
5 SOLUTION, ORAL ORAL
Status: DISCONTINUED | OUTPATIENT
Start: 2023-11-28 | End: 2023-11-28 | Stop reason: HOSPADM

## 2023-11-28 RX ORDER — HYDROMORPHONE HYDROCHLORIDE 1 MG/ML
0.1 INJECTION, SOLUTION INTRAMUSCULAR; INTRAVENOUS; SUBCUTANEOUS
Status: DISCONTINUED | OUTPATIENT
Start: 2023-11-28 | End: 2023-11-28 | Stop reason: HOSPADM

## 2023-11-28 RX ORDER — CELECOXIB 200 MG/1
200 CAPSULE ORAL ONCE
Status: COMPLETED | OUTPATIENT
Start: 2023-11-28 | End: 2023-11-28

## 2023-11-28 RX ORDER — ALPRAZOLAM 0.25 MG/1
.25-.5 TABLET ORAL
Status: DISCONTINUED | OUTPATIENT
Start: 2023-11-28 | End: 2023-11-28 | Stop reason: HOSPADM

## 2023-11-28 RX ORDER — ONDANSETRON 4 MG/1
4 TABLET, FILM COATED ORAL EVERY 8 HOURS PRN
Qty: 9 EACH | Refills: 0 | Status: SHIPPED | OUTPATIENT
Start: 2023-11-28 | End: 2023-12-01

## 2023-11-28 RX ORDER — HYDROMORPHONE HYDROCHLORIDE 1 MG/ML
0.2 INJECTION, SOLUTION INTRAMUSCULAR; INTRAVENOUS; SUBCUTANEOUS
Status: DISCONTINUED | OUTPATIENT
Start: 2023-11-28 | End: 2023-11-28 | Stop reason: HOSPADM

## 2023-11-28 RX ADMIN — LIDOCAINE HYDROCHLORIDE 60 MG: 20 INJECTION, SOLUTION EPIDURAL; INFILTRATION; INTRACAUDAL at 13:34

## 2023-11-28 RX ADMIN — ONDANSETRON 4 MG: 2 INJECTION INTRAMUSCULAR; INTRAVENOUS at 14:27

## 2023-11-28 RX ADMIN — PROPOFOL 50 MG: 10 INJECTION, EMULSION INTRAVENOUS at 13:36

## 2023-11-28 RX ADMIN — FENTANYL CITRATE 50 MCG: 50 INJECTION, SOLUTION INTRAMUSCULAR; INTRAVENOUS at 13:44

## 2023-11-28 RX ADMIN — ONDANSETRON 4 MG: 2 INJECTION INTRAMUSCULAR; INTRAVENOUS at 14:11

## 2023-11-28 RX ADMIN — EPHEDRINE SULFATE 10 MG: 50 INJECTION, SOLUTION INTRAVENOUS at 13:49

## 2023-11-28 RX ADMIN — CELECOXIB 200 MG: 200 CAPSULE ORAL at 12:43

## 2023-11-28 RX ADMIN — DEXAMETHASONE SODIUM PHOSPHATE 8 MG: 4 INJECTION INTRA-ARTICULAR; INTRALESIONAL; INTRAMUSCULAR; INTRAVENOUS; SOFT TISSUE at 13:50

## 2023-11-28 RX ADMIN — SODIUM CHLORIDE, POTASSIUM CHLORIDE, SODIUM LACTATE AND CALCIUM CHLORIDE: 600; 310; 30; 20 INJECTION, SOLUTION INTRAVENOUS at 13:20

## 2023-11-28 RX ADMIN — ACETAMINOPHEN 1000 MG: 500 TABLET ORAL at 12:43

## 2023-11-28 RX ADMIN — FENTANYL CITRATE 50 MCG: 50 INJECTION, SOLUTION INTRAMUSCULAR; INTRAVENOUS at 13:34

## 2023-11-28 RX ADMIN — CEFAZOLIN 2 G: 1 INJECTION, POWDER, FOR SOLUTION INTRAMUSCULAR; INTRAVENOUS at 13:34

## 2023-11-28 RX ADMIN — PROPOFOL 150 MG: 10 INJECTION, EMULSION INTRAVENOUS at 13:34

## 2023-11-28 RX ADMIN — EPHEDRINE SULFATE 15 MG: 50 INJECTION, SOLUTION INTRAVENOUS at 14:03

## 2023-11-28 ASSESSMENT — PAIN DESCRIPTION - PAIN TYPE
TYPE: SURGICAL PAIN

## 2023-11-28 ASSESSMENT — PAIN SCALES - GENERAL: PAIN_LEVEL: 0

## 2023-11-28 NOTE — ANESTHESIA PREPROCEDURE EVALUATION
Case: [id number] Date/Time: 11/28/23 1245    Procedure: EXCISIONAL BIOPSY OF RIGHT BREAST MASS    Pre-op diagnosis: RIGHT BREAST MASS, FAMILY HISTORY OF BREAST CANCER    Location: [facility] ROOM 28 / SURGERY SAME DAY [facility]    Surgeons: Maria Dolores May M.D.            Relevant Problems   No relevant active problems   Arthritis  Asthma    Physical Exam    Airway   Mallampati: II  TM distance: >3 FB  Neck ROM: full       Cardiovascular - normal exam  Rhythm: regular  Rate: normal  (-) murmur     Dental       Very poor dentition     Pulmonary - normal exam  Breath sounds clear to auscultation     Abdominal    Neurological - normal exam                   Anesthesia Plan    ASA 2       Plan - general       Airway plan will be LMA          Induction: intravenous    Postoperative Plan: Postoperative administration of opioids is intended.    Pertinent diagnostic labs and testing reviewed    Informed Consent:    Anesthetic plan and risks discussed with patient.

## 2023-11-28 NOTE — ANESTHESIA POSTPROCEDURE EVALUATION
Patient: Nadine Hurd    Procedure Summary       Date: 11/28/23 Room / Location: [facility] ROOM 28 / SURGERY SAME DAY [facility]    Anesthesia Start: 1320 Anesthesia Stop: 1426    Procedure: EXCISIONAL BIOPSY OF RIGHT BREAST MASS (Right: Breast) Diagnosis: (RIGHT BREAST MASS, FAMILY HISTORY OF BREAST CANCER)    Surgeons: Maria Dolores May M.D. Responsible Provider: Timmy Sellers M.D.    Anesthesia Type: general ASA Status: 2            Final Anesthesia Type: general  Last vitals  BP   Blood Pressure : 125/71    Temp   36.4 °C (97.5 °F)    Pulse   82   Resp   20    SpO2   98 %      Anesthesia Post Evaluation    Patient location during evaluation: PACU  Patient participation: complete - patient participated  Level of consciousness: awake and alert  Pain score: 0    Airway patency: patent  Anesthetic complications: no  Cardiovascular status: hemodynamically stable  Respiratory status: acceptable  Hydration status: euvolemic    PONV: none          There were no known notable events for this encounter.     Nurse Pain Score: 0 (NPRS)

## 2023-11-28 NOTE — DISCHARGE INSTRUCTIONS
HOME CARE INSTRUCTIONS    ACTIVITY: Rest and take it easy for the first 24 hours.  A responsible adult is recommended to remain with you during that time.  It is normal to feel sleepy.  We encourage you to not do anything that requires balance, judgment or coordination.    FOR 24 HOURS DO NOT:  Drive, operate machinery or run household appliances.  Drink beer or alcoholic beverages.  Make important decisions or sign legal documents.    SPECIAL INSTRUCTIONS: May shower, but no baths for at least four weeks.  Wear binder over sports bra for compression 24/7 except when showering. Ok to shower with dressing, leave in place until follow up. Walk frequently.  Follow Dr. May's written post op instructions given at office .     DIET: To avoid nausea, slowly advance diet as tolerated, avoiding spicy or greasy foods for the first day.  Add more substantial food to your diet according to your physician's instructions.  Babies can be fed formula or breast milk as soon as they are hungry.  INCREASE FLUIDS AND FIBER TO AVOID CONSTIPATION.    MEDICATIONS: Resume taking daily medication.  Take prescribed pain medication with food.  If no medication is prescribed, you may take non-aspirin pain medication if needed.  PAIN MEDICATION CAN BE VERY CONSTIPATING.  Take a stool softener or laxative such as senokot, pericolace, or milk of magnesia if needed.    Last pain medication given: Tylenol and Celebrex (anti-inflammatory similar to Ibuprofen/Motrin) at 12:45pm  Zofran given at 2:30pm    A follow-up appointment should be arranged with your doctor; call to schedule.    You should CALL YOUR PHYSICIAN if you develop:  Fever greater than 101 degrees F.  Pain not relieved by medication, or persistent nausea or vomiting.  Excessive bleeding (blood soaking through dressing) or unexpected drainage from the wound.  Extreme redness or swelling around the incision site, drainage of pus or foul smelling drainage.  Inability to urinate or empty  your bladder within 8 hours.  Problems with breathing or chest pain.    You should call 911 if you develop problems with breathing or chest pain.  If you are unable to contact your doctor or surgical center, you should go to the nearest emergency room or urgent care center.    Physician's telephone #: Dr. May [phone]    MILD FLU-LIKE SYMPTOMS ARE NORMAL.  YOU MAY EXPERIENCE GENERALIZED MUSCLE ACHES, THROAT IRRITATION, HEADACHE AND/OR SOME NAUSEA.    If any questions arise, call your doctor.  If your doctor is not available, please feel free to call the Surgical Center at [phone].  The Center is open Monday through Friday from 7AM to 7PM.      A registered nurse may call you a few days after your surgery to see how you are doing after your procedure.    You may also receive a survey in the mail within the next two weeks and we ask that you take a few moments to complete the survey and return it to us.  Our goal is to provide you with very good care and we value your comments.     Depression / Suicide Risk    As you are discharged from this [facility] facility, it is important to learn how to keep safe from harming yourself.    Recognize the warning signs:  Abrupt changes in personality, positive or negative- including increase in energy   Giving away possessions  Change in eating patterns- significant weight changes-  positive or negative  Change in sleeping patterns- unable to sleep or sleeping all the time   Unwillingness or inability to communicate  Depression  Unusual sadness, discouragement and loneliness  Talk of wanting to die  Neglect of personal appearance   Rebelliousness- reckless behavior  Withdrawal from people/activities they love  Confusion- inability to concentrate     If you or a loved one observes any of these behaviors or has concerns about self-harm, here's what you can do:  Talk about it- your feelings and reasons for harming yourself  Remove any means that you might use to hurt  yourself (examples: pills, rope, extension cords, firearm)  Get professional help from the community (Mental Health, Substance Abuse, psychological counseling)  Do not be alone:Call your Safe Contact- someone whom you trust who will be there for you.  Call your local CRISIS HOTLINE 450-2887 or 592-910-2100  Call your local Children's Mobile Crisis Response Team [place] (874) 664-8671 or www.GreenGar  Call the toll free National Suicide Prevention Hotlines   National Suicide Prevention Lifeline 316-845-TJCG (9345)  [place] Line Network 800-SUICIDE (034-2727)    I acknowledge receipt and understanding of these Home Care instructions.

## 2023-11-28 NOTE — ANESTHESIA TIME REPORT
Anesthesia Start and Stop Event Times       Date Time Event    11/28/2023 1313 Ready for Procedure     1320 Anesthesia Start     1426 Anesthesia Stop          Responsible Staff  11/28/23      Name Role Begin End    Timmy Sellers M.D. Anesth 1320 1426          Overtime Reason:  no overtime (within assigned shift)    Comments:

## 2023-11-28 NOTE — OR NURSING
1422 Patient arrived to PACU. Report received from anesthesia and OR RN. Patient on 6L of oxygen via mask. Placed on monitor. Patients surgical site dressing CDI . Patient awake. Complaints of nausea.     1430 Zofran given.    1445 Patient transitioned to room air, patient tolerating water. Denies any needs for pain.  Patient spouse updated on recovery.    1530 Patients spouse brought back to recovery. Patient states nausea improved.     1615 Discharge education provided and questions answered. Educated on medications sent to pharmacy. PIV removed.    1640 Patient discharged with spouse.  All belongings gathered and sent with patient.

## 2023-11-28 NOTE — OP REPORT
Operative Report    Date: 11/28/2023      Pre-operative Diagnosis: Neoplasm uncertain behavior right breast    Post-operative Diagnosis: Same    Procedure: Excisional biopsy right breast      Surgeon: Maria Dolores May M.D.    Assistant: None    Anesthesiologist: Timmy Sellers MD      Anesthesia:  Genera    Pre-Op Meds:  Ancef - tolerated without any issues    ASA class:  2    Indications:   This is 68 year old female with a palpable right breast mass.  This underwent core biopsy in another surgeon's office which showed atypical hyperplasia.  After discussing risks and benefits of her options, she is ready to proceed with excisional biopsy.    Findings:   Palpable mass in the right upper outer posterior breast.    Summary:   The patient was anesthetized, then prepped and draped in sterile fashion.  Time out was confirmed.  Local anesthetic was injected prior to incision.  I  made a curvilinear incision in the upper outer breast, then dissected 3cm down toward the palpable mass which was just anterior to the chest wall.  Though discrete by palpation, there was no focal circumscribed mass.  I excised widely to achieve generous sampling and usual orienting sutures were placed.  Specimen mammogram was performed though there is no biopsy clip to image.  I irrigated and ensured hemostasis, with extra attention, given her bloodless protocol status.      I used 3-0 vicryl (figure of eight with longer tails) to reapproximate her breast tissue.  I closed the deep dermal layer and 4-0 monocryl was used for skin.  Dressing was placed and I was informed all counts were correct.          CC:   Dru Conroy MD

## 2023-11-28 NOTE — ANESTHESIA PROCEDURE NOTES
Airway    Date/Time: 11/28/2023 1:35 PM    Performed by: Timmy Sellers M.D.  Authorized by: Timmy Sellers M.D.    Location:  OR  Urgency:  Elective  Indications for Airway Management:  Anesthesia      Spontaneous Ventilation: absent    Sedation Level:  Deep  Preoxygenated: Yes    Mask Difficulty Assessment:  0 - not attempted  Final Airway Type:  Supraglottic airway  Final Supraglottic Airway:  Standard LMA    SGA Size:  3  Number of Attempts at Approach:  1

## 2023-11-29 LAB — EKG IMPRESSION: NORMAL

## 2023-11-29 PROCEDURE — 93010 ELECTROCARDIOGRAM REPORT: CPT | Performed by: INTERNAL MEDICINE

## 2023-12-06 ENCOUNTER — OFFICE VISIT (OUTPATIENT)
Dept: SURGERY | Facility: MEDICAL CENTER | Age: 68
End: 2023-12-06
Payer: MEDICARE

## 2023-12-06 VITALS
HEART RATE: 104 BPM | DIASTOLIC BLOOD PRESSURE: 94 MMHG | HEIGHT: 67 IN | WEIGHT: 185 LBS | SYSTOLIC BLOOD PRESSURE: 132 MMHG | TEMPERATURE: 94.6 F | OXYGEN SATURATION: 98 % | BODY MASS INDEX: 29.03 KG/M2

## 2023-12-06 DIAGNOSIS — C50.411 CARCINOMA OF UPPER-OUTER QUADRANT OF FEMALE BREAST, RIGHT (HCC): ICD-10-CM

## 2023-12-06 PROCEDURE — 99024 POSTOP FOLLOW-UP VISIT: CPT | Performed by: SURGERY

## 2023-12-06 PROCEDURE — 3080F DIAST BP >= 90 MM HG: CPT | Performed by: SURGERY

## 2023-12-06 PROCEDURE — 3075F SYST BP GE 130 - 139MM HG: CPT | Performed by: SURGERY

## 2023-12-06 NOTE — PROGRESS NOTES
"12/6/2023  7:08 AM    Primary care provider:  Dru Conroy M.D.    CC:   Chief Complaint   Patient presents with    Post-op        HPI: This very pleasant 68 y.o. female returns with her  Jet for routine postoperative appointment.  She had minimal pain.  Her  reviewed the pathology report online, but she did not.      Allergies   Allergen Reactions    Bloodless Unspecified     Jehovah witness    Ceclor [Cefaclor] Anaphylaxis    Other Environmental Anaphylaxis     Bees    Clindamycin Rash     rash    Codeine Nausea     Nausea and vomiting    Epinephrine Unspecified     tachycardia    Food Dye Swelling     Throat swells    Other Misc Rash     Tape    Penicillins Itching    Zinc Swelling     Current Outpatient Medications   Medication Sig    multivitamin Tab Take 1 Tablet by mouth every day.    levothyroxine (EUTHYROX) 75 MCG Tab Take 1 Tablet by mouth every day.    Cholecalciferol (VITAMIN D3 PO) Take 5,000 Int'l Units by mouth every day.    Ascorbic Acid (VITAMIN C) 1000 MG Tab Take 1 Tablet by mouth every day.     Physical Exam:  BP (!) 132/94 (BP Location: Left arm, Patient Position: Sitting, BP Cuff Size: Large adult)   Pulse (!) 104   Temp (!) 34.8 °C (94.6 °F) (Temporal)   Ht 1.702 m (5' 7\")   Wt 83.9 kg (185 lb)   LMP 10/05/2013 (Approximate) Comment: Age of first period: 9  SpO2 98%   BMI 28.98 kg/m²     General: Very pleasant demeanor.  Appears well, understandably anxious.  Surgical site:  RIGHT upper outer incision is healing nicely.  No infection or hematoma.   See scanned diagram    1. 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  Pathologic Stage pT2 cN0 M0.  Core biopsies showed ALH (Dr. Bello).  Excisional biopsy 11/28/23.   2.1cm on excision. (2cm on exam.  Vague sonographic finding measured to be almost 4cm.  Mammo shows a " discrete asymmetry/AD 1.6cm).  Margins involved:  Lateral (8mm breadth).    Close margins:  Inferior (<0.5mm over a 1mm breadth), Superior (<1 mm over a 0.5mm breadth), Posterior (1mm over a 4mm breadth)   Grade 1.  LVI not seen.  ER >90%.  FL 81-90%.  Ki-67 <10%.    HER2 negative by IHC.    Screening mammo 2/15/23 [facility]:  Scattered FGD.      Paternal aunt with breast cancer (50).  Other FH for colon and rectal cancer, uterine cancer, brother with brain tumor.  Referred to Genetics.       No smoking or alcohol history.  BMI 29.  Jew.  Refuses blood products.      DISCUSSED/PLAN:  We reviewed her pathology report in detail.  We discussed the diagnosis and options for management per NCCN guidelines.  I discussed how breast cancer can present in many different ways and have very different features. While today's discussion will be focused primarily on surgical options, the post-surgical therapy can also be important to reduce the risk of the cancer recurring or spreading.  Treatment can involves a combination of surgery, medical therapy, and radiation therapy, but we try to tailor each patient's treatment plan to their individual situation.  We discussed the features of her particular diagnosis and how that might affect her individual prognosis.  Illustrations were used to differentiate between invasive and noninvasive, and lobular versus ductal cancer.      We discussed the sometimes insidious nature of lobular cancers and that they are sometimes larger or multifocal, and not always well defined on breast imaging.  Obtaining negative margins can often be more challenging, and the risk of needing additional surgery is often higher.  We discussed the pros and cons of breast MRI.  While not perfect, sometimes the MRI can guide our treatment plan better.  However, MRI can also overcall findings.   Any suspicious lesions may require further workup and/or biopsy.  We continued with discussion of surgical  options, but I explained that the plan could change depending on MRI findings.  Her last MRI was done in June, 2023.      We discussed usual surgical options including breast conservation (BCS) with partial mastectomy and breast irradiation (XRT), versus total mastectomy (removal of the breast on one side).  There are some instances in which radiation following mastectomy might be recommended.  I explained the risks and benefits of each approach, as well as differences in recovery/restrictions. Radiation lowers the risk of same breast recurrence and is felt to provide comparable outcomes overall when added to breast conserving surgery.  I explained the possibility of needing further surgery if margins are not clear which is sometimes only discovered on microscopic evaluation after surgery.  Margins refer to the edge of the specimen which are considered “free” or “clear” if  there is no tumor cell present there.  This is typically not able to be determined with 100% accuracy during surgery, as the complete pathologic analysis does require multiple steps over days.  However, we can lower this risk of needing additional surgery by excising generously or taking additional margins as needed.  This can lead to a larger defect and more significant change such as dimpling, divoting, or distortion.  I anticipate re-excision of at least the LATERAL, INFERIOR, SUPERIOR, POSTERIOR margins.      We discussed the role of reconstructive or oncoplastic approaches designed to minimize contour defects and improve overall cosmesis.  Sometimes a mastopexy, mammoplasty, tissue transfer, local tissue rearrangement, or dermoglandular flap reconstruction can be performed to improve surgical access to the area of interest with a more aesthetically appealing outcome.  Sometimes this can result in repositioning or reshaping of the NAC, but could hide the surgical scar better.  Some techniques require the expertise of a plastic reconstructive  surgeon (JOSE MARTIN).  Depending on her procedure, we often perform surgery on the other breast for symmetry.  She could be a candidate for a local tissue rearrangement with contralateral mammaplasty.  However, if additional surgery is required for margins, she might then need a total mastectomy.  She firmly states that she is hoping to avoid total mastectomy if at all possible.     I explained the role of axillary (armpit) node evaluation and technique and principle behind sentinel node identification and removal of sentinel nodes (SLNB).  We discussed the role of radiotracer injection and blue dye which might be injected in the breast itself or the upper arm, as well as expected side effects.  We discussed risks and benefits that can include bleeding, infection, injury to the nerves which can lead to possible chronic pain and/or numbness, persistent issues with mobility, lymphedema, and other complications.  The risk for complications is higher with more nodes removed, but we can also refer for lymphedema/physical therapy.  Risks of lymphedema are lifelong and require vigilance for early symptoms.  However, with education and awareness, early detection and treatment can lead to lower rates of significant lymphedema.  We also try to limit the number of nodes removed when possible, though sometimes more extensive node removal is necessary.  The [facility] lymphedema therapist hosts an educational class that we recommend patients attend, and a flyer with this information was provided.        Typically, the Medical Oncology consultation occurs after surgery to discuss postoperative systemic treatment options.  I explained that decisions regarding recommended treatment options, sometimes including chemotherapy, can depend on final pathology and other factors, and this would be addressed by them. She does appear to be a candidate for eventual endocrine therapy (commonly known as estrogen blockers).      I explained that when  indicated, the radiation oncologist typically meets with the patient following surgery to discuss options for treatment, once the final pathology and margin status are known, which can guide radiation therapy recommendations.  I reviewed that even with clear margins, this is part of conventional planned treatment for breast conserving therapy.      She was already referred to Genetics, but has not yet made her appointment.  I advised her to proceed.  We will also refer her to the [facility] nurse navigator for assistance with resources as needed.    She remains very concerned about why this cancer was not identified sooner, and expresses nausea at the prospect that she could have had this diagnosis for a long time. I did explain the challenge of distinguishing atypical hyperplasia from cancer with only portions of tissue, and the role of wider excision for some cases.  She acknowledges that she was scheduled for surgery back in June, with Dr. Bello, but felt she needed to cancel for other reasons.     She indicates that she needs additional time to process all of this, but does wish to proceed with referral to Plastic Surgery as this can take some time to coordinate.      Total time spent today, including personal review and independent interpretation of pathology report, face to face time (30min for this alone), coordination of care, chart documentation, and , was 40 minutes.  Ample time was provided for questions, and a written outline of the treatment plan was provided    PLAN:  RIGHT partial mastectomy, RIGHT SLN injection with excision of axillary nodes.  Re-excision of LATERAL, INFERIOR, SUPERIOR, POSTERIOR margins. LTR/CM.  Return to OR for additional work.      REFERRALS:    Plastic Surgery  Hospital navigator

## 2023-12-08 DIAGNOSIS — C50.411 CARCINOMA OF UPPER-OUTER QUADRANT OF FEMALE BREAST, RIGHT (HCC): ICD-10-CM

## 2023-12-13 ENCOUNTER — PATIENT OUTREACH (OUTPATIENT)
Dept: ONCOLOGY | Facility: MEDICAL CENTER | Age: 68
End: 2023-12-13
Payer: COMMERCIAL

## 2023-12-13 ENCOUNTER — APPOINTMENT (OUTPATIENT)
Dept: RADIOLOGY | Facility: MEDICAL CENTER | Age: 68
End: 2023-12-13
Attending: SURGERY
Payer: MEDICARE

## 2023-12-13 DIAGNOSIS — Z65.8 PSYCHOSOCIAL DISTRESS: ICD-10-CM

## 2023-12-13 NOTE — LETTER
[facility]shan QUI[facility]ONE[facility]    [address]  [place] [address]  Phone: [phone] - Fax: [phone]              Nadine Rosita Hurd  [address] [place] [address]     Date: 12/13/23  Medical Record Number: [id number]    Dear Nadine,    I am a Cancer Nurse Navigator, a certified oncology nurse. My role is to assess any needs you may have with education, guidance and support. I am available to you and your family through your treatment at [facility].       I am available to address your needs during your journey with the following services:     Care Coordination  I can assist you in facilitating communication between your cancer care treatment team to ensure timely treatment and follow-up.  I can also assist with transition of care back to your primary care provider, or other specialist, as needed.  My goal is to bridge gaps for you throughout the course of your active treatment.       Education Services  Understanding the recommended treatment course by your physician is key. I can provide educational resources personalized to your cancer diagnosis to help you understand your diagnosis and treatment. Please let me know if you would like to receive information about your diagnosis and treatment plan.  I am here to help.     Support Services/Resource Information  [facility] we offer a full scope of support services.  I can assist you with referral information to:  Cancer Clinical Trials & Research  Nutrition counseling  Support groups  Complementary Therapies such as Mind-Body Techniques Meditation  Patient Financial Advocates    [facility], an [employer] affiliate office, our volunteers can assist you with accessing our [employer] library, support services information, head coverings and comfort items  Community and national resources, included eligibility based bola assistance and pharmaceutical access programs, if you are in need of  additional information.     [facility] offers services that include:  Behavioral Health  Genetic counseling & testing  Acupuncture  Lymphedema prevention/treatment program  Palliative care services.        I hope you have an excellent patient experience.  Please feel free to share with me your comments regarding the care you have received- we value your feedback.    Sincerely,     Alexandro Vela R.N.  Cancer Nurse Navigator    Office: [phone] / [phone]   Email:  [email or web address]

## 2023-12-13 NOTE — LETTER
Ishan QUIÑONEZ [facility]    [address]  [place] [address]  Phone: [phone] - Fax: [phone]              Nadine Rosita Hurd  [address] [place] [address]     Date: 12/13/23  Medical Record Number: [id number]    Dear Nadine,    I am a Cancer Nurse Navigator, a certified oncology nurse. My role is to assess any needs you may have with education, guidance and support. I am available to you and your family through your treatment at [facility].       I am available to address your needs during your journey with the following services:     Care Coordination  I can assist you in facilitating communication between your cancer care treatment team to ensure timely treatment and follow-up.  I can also assist with transition of care back to your primary care provider, or other specialist, as needed.  My goal is to bridge gaps for you throughout the course of your active treatment.       Education Services  Understanding the recommended treatment course by your physician is key. I can provide educational resources personalized to your cancer diagnosis to help you understand your diagnosis and treatment. Please let me know if you would like to receive information about your diagnosis and treatment plan.  I am here to help.     Support Services/Resource Information  [facility] we offer a full scope of support services.  I can assist you with referral information to:  Cancer Clinical Trials & Research  Nutrition counseling  Support groups  Complementary Therapies such as Mind-Body Techniques Meditation  Patient Financial Advocates    [facility], an [employer] affiliate office, our volunteers can assist you with accessing our [employer] library, support services information, head coverings and comfort items  Community and national resources, included eligibility based bola assistance and pharmaceutical access programs, if you are in need of  additional information.     [facility] offers services that include:  Behavioral Health  Genetic counseling & testing  Acupuncture  Lymphedema prevention/treatment program  Palliative care services.        I hope you have an excellent patient experience.  Please feel free to share with me your comments regarding the care you have received- we value your feedback.    Sincerely,     Alexandro Vela R.N.  Cancer Nurse Navigator    Office: [phone] / [phone]   Email:  [email or web address]

## 2023-12-13 NOTE — PROGRESS NOTES
"Oncology Nurse Navigation Assessment  Pt returns call.  She is scheduled for surgery with Dr. May.  She has been referred to Dr. Lucia for plastic surgery consult.         DX: Invasive lobular carcinoma of the right breast  HR +  HER2 -    POC: right partial mastectomy / sentinel lymph node biopsy    FAMHX: Cancer-related family history is not on file.  Brother and sister glioblastoma.  Cousin breast cancer.   Referred to genetic counseling.           BARRIERS ASSESSMENT:    TRANSPORTATION: denies barriers.   will accompany her to surgery.  EMPLOYMENT:  retired.  Owns a ranch.   FINANCIAL:  denies barriers  INSURANCE:  medicare / AARP  [facility] Advantage starting Jan 1.  SUPPORT SYSTEM:  .  Son and  available to assist post op.  PSYCHOLOGICAL: distress screening 8/10 \"worried about our son\"  needs a valve replacement  COMMUNICATION: no barrier noted    FAMILY CARE:  denies barriers  SELF CARE:  denies barriers         INTERVENTION:  Intro letter and resources provided.    "

## 2023-12-13 NOTE — PROGRESS NOTES
Oncology Nurse Navigation  Phoned for follow up.  No answer, left message.   Intro letter and resources mailed.    [facility]        NAME: Haider Monte is a 56 y o  male  : 1958    MRN: [id number]  DATE: 2018  TIME: 9:38 AM    Assessment and Plan   Flank pain [R10 9]  1  Flank pain  Transfer to other facility         Patient Instructions     Patient is going directly to the ED by ambulance for further work-up treatment and evaluation    Chief Complaint     Chief Complaint   Patient presents with    Back Pain     lower back into front x 0730, sharp/stabbing pain, with nausea  denies inury          History of Present Illness       Flank Pain   This is a new problem  The current episode started today  The problem occurs constantly  The problem is unchanged  Pain location: right flank  The quality of the pain is described as shooting and stabbing  Radiates to: right side of abdomen  The pain is at a severity of 10/10  The pain is severe  The pain is the same all the time  Exacerbated by: nothing  Associated symptoms include abdominal pain  Pertinent negatives include no bladder incontinence, bowel incontinence, chest pain, dysuria (but has not been able to urinate since this pain started at 7:30am), fever, headaches, leg pain, numbness, paresis, paresthesias, perianal numbness, tingling or weakness  (Nausea) Risk factors: denies any trauma or injuries, denies any hx of kidney stones, hx of HTN  He has tried nothing for the symptoms  Rick Medina dropped him off here, he needs an ambulance to get to ED    Review of Systems   Review of Systems   Constitutional: Negative for fever  HENT: Negative for ear pain, rhinorrhea, sore throat and trouble swallowing  Eyes: Negative for itching and visual disturbance  Respiratory: Negative for cough, shortness of breath and wheezing  Cardiovascular: Negative for chest pain and leg swelling  Gastrointestinal: Positive for abdominal pain and nausea  Negative for bowel incontinence, diarrhea and vomiting  Genitourinary: Positive for flank pain  Negative for bladder incontinence, dysuria (but has not been able to urinate since this pain started at 7:30am) and hematuria  Musculoskeletal: Negative for joint swelling, neck pain and neck stiffness  Skin: Negative for rash  Neurological: Negative for dizziness, tingling, seizures, weakness, numbness, headaches and paresthesias  All other systems reviewed and are negative          Current Medications       Current Outpatient Prescriptions:     aspirin 81 mg chewable tablet, Chew 81 mg, Disp: , Rfl:     atorvastatin (LIPITOR) 10 mg tablet, TAKE 1 TABLET BY MOUTH  DAILY, Disp: 90 tablet, Rfl: 1    Fesoterodine Fumarate ER (TOVIAZ) 4 MG TB24, Take 1 tablet (4 mg total) by mouth daily with dinner, Disp: 30 tablet, Rfl: 11    FLUoxetine (PROzac) 20 mg capsule, TAKE ONE CAPSULE BY MOUTH EVERY DAY, Disp: 90 capsule, Rfl: 2    folic acid (FOLVITE) 1 mg tablet, Take by mouth daily, Disp: , Rfl:     lisinopril-hydrochlorothiazide (PRINZIDE,ZESTORETIC) 10-12 5 MG per tablet, Take 1 tablet by mouth daily, Disp: , Rfl:     methotrexate 2 5 mg tablet, Take 20 mg by mouth once a week, Disp: , Rfl:     Mirabegron ER (MYRBETRIQ) 25 MG TB24, Take 25 mg by mouth daily with dinner, Disp: 30 tablet, Rfl: 0    Multiple Vitamins-Minerals (MULTIVITAMIN MEN) TABS, Take 1 tablet by mouth daily, Disp: , Rfl:     Omega-3 Fatty Acids (FISH OIL) 1,000 mg, Take 1,000 mg by mouth daily, Disp: , Rfl:     predniSONE 10 mg tablet, 6tabs daily for 3 days,5 tabs daily for 3 days,4 tabs daily for 3 days,3 tabs daily  For 3 days, 2 tabs daily for 3 days, 1 tab daily for 3 days, Disp: 63 tablet, Rfl: 0    predniSONE 5 mg tablet, , Disp: , Rfl:     RASUVO 20 MG/0 4ML SOAJ, INJECT 20 MG SUBCUTANEOUSLY ONCE A WEEK, Disp: , Rfl: 4    tamsulosin (FLOMAX) 0 4 mg, TAKE 1 CAPSULE BY MOUTH  DAILY AT BEDTIME, Disp: 90 capsule, Rfl: 3    XELJANZ XR 11 MG TB24, , Disp: , Rfl:     Current Allergies     Allergies as of 09/04/2018 - Reviewed 09/04/2018   Allergen Reaction Noted    Fentanyl Other (See Comments) and Anaphylaxis 09/08/2015            The following portions of the patient's history were reviewed and updated as appropriate: allergies, current medications, past family history, past medical history, past social history, past surgical history and problem list      Past Medical History:   Diagnosis Date    Arthritis     Cellulitis     LAST ASSESSED: 6/13/14    Enlarged prostate     Epidermal inclusion cyst     LAST ASSESSED: 10/4/13    Erythrasma     LAST ASSESSED: 9/23/13    Furuncle     LAST ASSESSED: 6/11/14    Hyperlipidemia     Hypertension        Past Surgical History:   Procedure Laterality Date    HYDROCELE EXCISION / REPAIR Right 01/11/2018    SPERMATIC CORD EXCSION OF HYDROCELE; MANAGED BY: [employer] [employer]    ME REMOVAL OF HYDROCELE,TUNICA,UNILAT Right 1/11/2018    Procedure: HYDROCELECTOMY;  Surgeon: Nicky Sousa MD;  [facility];  Service: Urology    SCROTAL SURGERY      benign "lump"       Family History   Problem Relation Age of Onset    Heart disease Father         CARDIAC DISORDER    Hypertension Father     Hypertension Mother     No Known Problems Maternal Aunt     No Known Problems Maternal Uncle     No Known Problems Paternal Aunt     No Known Problems Paternal Uncle     No Known Problems Paternal Grandmother     No Known Problems Paternal Grandfather     Diabetes Maternal Grandmother         MELLITUS    No Known Problems Maternal Grandfather     Hypertension Other          Medications have been verified  Objective   /80   Pulse 77   Temp 98 °F (36 7 °C)   Resp 18   Ht 5' 10" (1 778 m)   Wt 90 7 kg (200 lb)   SpO2 98%   BMI 28 70 kg/m²        Physical Exam     Physical Exam   Constitutional: He is oriented to person, place, and time  He appears well-developed and well-nourished  He appears distressed  HENT:   Head: Normocephalic and atraumatic     Mouth/Throat: Oropharynx is clear and moist    Eyes: EOM are normal  Pupils are equal, round, and reactive to light  Neck: Normal range of motion  Neck supple  Cardiovascular: Normal rate and regular rhythm  Pulmonary/Chest: Effort normal and breath sounds normal  He has no wheezes  Abdominal: Soft  Bowel sounds are normal  There is tenderness in the right lower quadrant  There is CVA tenderness  Musculoskeletal: Normal range of motion  He exhibits no tenderness or deformity  No bony tenderness of back/spine   Neurological: He is alert and oriented to person, place, and time  NV intact with sensation and strong peripheral pulses  Skin: Skin is warm and dry  He is not diaphoretic  Psychiatric: He has a normal mood and affect  His behavior is normal    Nursing note and vitals reviewed

## 2023-12-14 ENCOUNTER — DOCUMENTATION (OUTPATIENT)
Dept: SURGERY | Facility: MEDICAL CENTER | Age: 68
End: 2023-12-14
Payer: COMMERCIAL

## 2023-12-14 ENCOUNTER — HOSPITAL ENCOUNTER (OUTPATIENT)
Dept: RADIOLOGY | Facility: MEDICAL CENTER | Age: 68
End: 2023-12-14
Attending: SURGERY
Payer: MEDICARE

## 2023-12-14 DIAGNOSIS — C50.411 CARCINOMA OF UPPER-OUTER QUADRANT OF FEMALE BREAST, RIGHT (HCC): ICD-10-CM

## 2023-12-14 PROCEDURE — C8908 MRI W/O FOL W/CONT, BREAST,: HCPCS

## 2023-12-14 PROCEDURE — 700117 HCHG RX CONTRAST REV CODE 255: Performed by: SURGERY

## 2023-12-14 PROCEDURE — A9579 GAD-BASE MR CONTRAST NOS,1ML: HCPCS | Performed by: SURGERY

## 2023-12-14 RX ADMIN — GADOTERIDOL 18 ML: 279.3 INJECTION, SOLUTION INTRAVENOUS at 12:19

## 2023-12-15 ENCOUNTER — TELEPHONE (OUTPATIENT)
Dept: SURGERY | Facility: MEDICAL CENTER | Age: 68
End: 2023-12-15
Payer: COMMERCIAL

## 2023-12-15 NOTE — TELEPHONE ENCOUNTER
Left message for pharmacy 1:40pm for EMLA cream, 2.5%, 30 grams, no refills. Asked them to cb to confirm they received prescription.

## 2023-12-18 ENCOUNTER — APPOINTMENT (OUTPATIENT)
Dept: ADMISSIONS | Facility: MEDICAL CENTER | Age: 68
End: 2023-12-18
Attending: SURGERY
Payer: MEDICARE

## 2023-12-22 ENCOUNTER — PRE-ADMISSION TESTING (OUTPATIENT)
Dept: ADMISSIONS | Facility: MEDICAL CENTER | Age: 68
End: 2023-12-22
Attending: SURGERY
Payer: MEDICARE

## 2023-12-22 DIAGNOSIS — Z01.812 PRE-OPERATIVE LABORATORY EXAMINATION: ICD-10-CM

## 2023-12-22 NOTE — OR NURSING
Received message back from Kaela, surgery scheduler for Dr. May, stating that there should be no issues with the surgical dye if a patient has a food dye allergy.

## 2023-12-22 NOTE — OR NURSING
This RN called and spoke to Kaela, Dr. May's surgery scheduler, to notify MD that pt is allergic to food dye, but is unsure if there is a particular color that she is allergic to. Kaela to speak with Dr. May about this since pt is to get the dye injection the day before surgery.

## 2023-12-26 ENCOUNTER — OFFICE VISIT (OUTPATIENT)
Dept: SURGERY | Facility: MEDICAL CENTER | Age: 68
End: 2023-12-26
Payer: MEDICARE

## 2023-12-26 ENCOUNTER — HOSPITAL ENCOUNTER (OUTPATIENT)
Facility: MEDICAL CENTER | Age: 68
End: 2023-12-26
Attending: SURGERY
Payer: MEDICARE

## 2023-12-26 VITALS
WEIGHT: 188 LBS | SYSTOLIC BLOOD PRESSURE: 118 MMHG | OXYGEN SATURATION: 94 % | HEART RATE: 91 BPM | HEIGHT: 67 IN | DIASTOLIC BLOOD PRESSURE: 76 MMHG | BODY MASS INDEX: 29.51 KG/M2 | TEMPERATURE: 98.2 F

## 2023-12-26 DIAGNOSIS — C50.411 CARCINOMA OF UPPER-OUTER QUADRANT OF FEMALE BREAST, RIGHT (HCC): ICD-10-CM

## 2023-12-26 DIAGNOSIS — L53.9 BREAST ERYTHEMA: ICD-10-CM

## 2023-12-26 PROCEDURE — 3074F SYST BP LT 130 MM HG: CPT | Performed by: SURGERY

## 2023-12-26 PROCEDURE — 99024 POSTOP FOLLOW-UP VISIT: CPT | Performed by: SURGERY

## 2023-12-26 PROCEDURE — 3078F DIAST BP <80 MM HG: CPT | Performed by: SURGERY

## 2023-12-26 PROCEDURE — 87205 SMEAR GRAM STAIN: CPT

## 2023-12-26 PROCEDURE — 87070 CULTURE OTHR SPECIMN AEROBIC: CPT

## 2023-12-26 PROCEDURE — 87075 CULTR BACTERIA EXCEPT BLOOD: CPT

## 2023-12-26 RX ORDER — CLINDAMYCIN HYDROCHLORIDE 300 MG/1
300 CAPSULE ORAL 3 TIMES DAILY
Qty: 21 CAPSULE | Refills: 1 | Status: ON HOLD | OUTPATIENT
Start: 2023-12-26 | End: 2023-12-28

## 2023-12-26 NOTE — PROGRESS NOTES
"12/26/2023  2:42 PM    Primary care provider:  Dru Conroy M.D.  Plastic surgeon:  Dr. Lucia    CC:   Chief Complaint   Patient presents with    Post-op        HPI: This very pleasant 68 y.o. female returns with report of redness, warmth, and swelling of her right breast.  This started a few days ago.  She feels a bit feverish as well, though.  She contacted us several times between her last visit and today's appointment with various questions regarding her upcoming plan, expressing hesitation for reconstruction.  We emphasized to her that she may accept or decline reconstruction, but reiterated cosmetic expectations to ensure she had realistic expectations.      Allergies   Allergen Reactions    Bloodless Unspecified     Jehovah witness    Ceclor [Cefaclor] Anaphylaxis    Food Dye Swelling     Throat swells-pt unsure if there is a particular color food dye that she has a reaction to specifically        Other Environmental Anaphylaxis     Bees    Clindamycin Rash     rash    Codeine Nausea     Nausea and vomiting    Epinephrine Unspecified     tachycardia    Other Misc Rash     Tape    Penicillins Itching    Zinc Swelling     Topical zinc oxide causes swelling and blisters to area       Current Outpatient Medications   Medication Sig    levothyroxine (EUTHYROX) 75 MCG Tab Take 1 Tablet by mouth every day.    multivitamin Tab Take 1 Tablet by mouth every day. (Patient not taking: Reported on 12/26/2023)    Cholecalciferol (VITAMIN D3 PO) Take 5,000 Int'l Units by mouth every day. (Patient not taking: Reported on 12/26/2023)    Ascorbic Acid (VITAMIN C) 1000 MG Tab Take 1 Tablet by mouth every day. (Patient not taking: Reported on 12/26/2023)       Physical Exam:  /76 (BP Location: Left arm, Patient Position: Sitting, BP Cuff Size: Large adult)   Pulse 91   Temp 36.8 °C (98.2 °F) (Temporal)   Ht 1.702 m (5' 7\")   Wt 85.3 kg (188 lb)   LMP 10/05/2013 (Approximate) Comment: Age of first period: 9  " SpO2 94%   BMI 29.44 kg/m²     General: Very pleasant demeanor.  Appears well, no acute distress systemically.  Surgical site:  RIGHT central breast has faint erythema that is somewhat dependent.  Very small fluid collection.  Under sterile conditions and with US guidance, I aspirated about 15cc of thin, serosanguinous fluid, which was cultured.    See scanned diagram    1. 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  Pathologic Stage pT2 cN0 M0.  Core biopsies showed ALH (Dr. Bello).  Excisional biopsy 11/28/23.              2.1cm on excision. (2cm on exam.  Vague sonographic finding measured to be almost 4cm.  Mammo shows a discrete asymmetry/AD 1.6cm).  Margins involved:  Lateral (8mm breadth).    Close margins:  Inferior (<0.5mm over a 1mm breadth), Superior (<1 mm over a 0.5mm breadth), Posterior (1mm over a 4mm breadth)              Grade 1.  LVI not seen.  ER >90%.  MI 81-90%.  Ki-67 <10%.    HER2 negative by IHC.     Screening mammo 2/15/23 [facility]:  Scattered FGD.      Paternal aunt with breast cancer (50).  Other FH for colon and rectal cancer, uterine cancer, brother with brain tumor.  Referred to Genetics.       No smoking or alcohol history.  BMI 29.  Religious.  Refuses blood products.      DISCUSSED/PLAN:  She has allergies to most of the major antibiotic classes, unfortunately, but felt that she does the best with clindamycin.  I advised her to obtain OTC remedies for her symptoms of itching and to call us tomorrow afternoon, prior to her injection, if she feels the redness is worsening.  Otherwise, she will check in at her expected time for surgery Thursday and we can reassess then whether we should proceed or not.      Plan remains for RIGHT partial mastectomy, RIGHT SLN injection with excision of axillary nodes.  Re-excision of LATERAL, INFERIOR,  SUPERIOR, POSTERIOR margins. LTR/CM.  Return to OR for additional work.

## 2023-12-27 ENCOUNTER — ANESTHESIA EVENT (OUTPATIENT)
Dept: SURGERY | Facility: MEDICAL CENTER | Age: 68
End: 2023-12-27
Payer: MEDICARE

## 2023-12-27 ENCOUNTER — HOSPITAL ENCOUNTER (OUTPATIENT)
Dept: RADIOLOGY | Facility: MEDICAL CENTER | Age: 68
End: 2023-12-27
Attending: SURGERY
Payer: MEDICARE

## 2023-12-27 DIAGNOSIS — C50.411 MALIGNANT NEOPLASM OF UPPER-OUTER QUADRANT OF RIGHT FEMALE BREAST, UNSPECIFIED ESTROGEN RECEPTOR STATUS (HCC): ICD-10-CM

## 2023-12-27 LAB
GRAM STN SPEC: NORMAL
SIGNIFICANT IND 70042: NORMAL
SITE SITE: NORMAL
SOURCE SOURCE: NORMAL

## 2023-12-27 PROCEDURE — 38792 RA TRACER ID OF SENTINL NODE: CPT | Mod: RT | Performed by: RADIOLOGY

## 2023-12-27 PROCEDURE — 38792 RA TRACER ID OF SENTINL NODE: CPT | Mod: RT

## 2023-12-28 ENCOUNTER — HOSPITAL ENCOUNTER (OUTPATIENT)
Dept: RADIOLOGY | Facility: MEDICAL CENTER | Age: 68
End: 2023-12-28
Attending: SURGERY
Payer: MEDICARE

## 2023-12-28 ENCOUNTER — HOSPITAL ENCOUNTER (OUTPATIENT)
Facility: MEDICAL CENTER | Age: 68
End: 2023-12-28
Attending: SURGERY | Admitting: SURGERY
Payer: MEDICARE

## 2023-12-28 ENCOUNTER — ANESTHESIA (OUTPATIENT)
Dept: SURGERY | Facility: MEDICAL CENTER | Age: 68
End: 2023-12-28
Payer: MEDICARE

## 2023-12-28 VITALS
DIASTOLIC BLOOD PRESSURE: 67 MMHG | HEART RATE: 70 BPM | WEIGHT: 186.73 LBS | TEMPERATURE: 98.8 F | SYSTOLIC BLOOD PRESSURE: 100 MMHG | HEIGHT: 67 IN | OXYGEN SATURATION: 94 % | BODY MASS INDEX: 29.31 KG/M2 | RESPIRATION RATE: 16 BRPM

## 2023-12-28 DIAGNOSIS — C50.411 MALIGNANT NEOPLASM OF UPPER-OUTER QUADRANT OF RIGHT FEMALE BREAST, UNSPECIFIED ESTROGEN RECEPTOR STATUS (HCC): ICD-10-CM

## 2023-12-28 LAB — PATHOLOGY CONSULT NOTE: NORMAL

## 2023-12-28 PROCEDURE — 700101 HCHG RX REV CODE 250: Performed by: SURGERY

## 2023-12-28 PROCEDURE — 160046 HCHG PACU - 1ST 60 MINS PHASE II: Performed by: SURGERY

## 2023-12-28 PROCEDURE — 160009 HCHG ANES TIME/MIN: Performed by: SURGERY

## 2023-12-28 PROCEDURE — 700111 HCHG RX REV CODE 636 W/ 250 OVERRIDE (IP): Mod: JZ,JG | Performed by: SURGERY

## 2023-12-28 PROCEDURE — 700111 HCHG RX REV CODE 636 W/ 250 OVERRIDE (IP): Mod: JZ | Performed by: ANESTHESIOLOGY

## 2023-12-28 PROCEDURE — 88305 TISSUE EXAM BY PATHOLOGIST: CPT | Mod: 59

## 2023-12-28 PROCEDURE — 160035 HCHG PACU - 1ST 60 MINS PHASE I: Performed by: SURGERY

## 2023-12-28 PROCEDURE — 160025 RECOVERY II MINUTES (STATS): Performed by: SURGERY

## 2023-12-28 PROCEDURE — 88307 TISSUE EXAM BY PATHOLOGIST: CPT | Mod: 59

## 2023-12-28 PROCEDURE — 160031 HCHG SURGERY MINUTES - 1ST 30 MINS LEVEL 5: Performed by: SURGERY

## 2023-12-28 PROCEDURE — 700101 HCHG RX REV CODE 250: Performed by: ANESTHESIOLOGY

## 2023-12-28 PROCEDURE — A9270 NON-COVERED ITEM OR SERVICE: HCPCS | Performed by: ANESTHESIOLOGY

## 2023-12-28 PROCEDURE — 160048 HCHG OR STATISTICAL LEVEL 1-5: Performed by: SURGERY

## 2023-12-28 PROCEDURE — 160002 HCHG RECOVERY MINUTES (STAT): Performed by: SURGERY

## 2023-12-28 PROCEDURE — 110371 HCHG SHELL REV 272: Performed by: SURGERY

## 2023-12-28 PROCEDURE — 700102 HCHG RX REV CODE 250 W/ 637 OVERRIDE(OP): Performed by: ANESTHESIOLOGY

## 2023-12-28 PROCEDURE — 700105 HCHG RX REV CODE 258: Performed by: ANESTHESIOLOGY

## 2023-12-28 PROCEDURE — 160042 HCHG SURGERY MINUTES - EA ADDL 1 MIN LEVEL 5: Performed by: SURGERY

## 2023-12-28 PROCEDURE — 76098 X-RAY EXAM SURGICAL SPECIMEN: CPT | Mod: RT

## 2023-12-28 PROCEDURE — 160036 HCHG PACU - EA ADDL 30 MINS PHASE I: Performed by: SURGERY

## 2023-12-28 RX ORDER — BUPIVACAINE HYDROCHLORIDE AND EPINEPHRINE 5; 5 MG/ML; UG/ML
INJECTION, SOLUTION EPIDURAL; INTRACAUDAL; PERINEURAL
Status: DISCONTINUED | OUTPATIENT
Start: 2023-12-28 | End: 2023-12-28 | Stop reason: HOSPADM

## 2023-12-28 RX ORDER — ACETAMINOPHEN 500 MG
1000 TABLET ORAL ONCE
Status: COMPLETED | OUTPATIENT
Start: 2023-12-28 | End: 2023-12-28

## 2023-12-28 RX ORDER — CLINDAMYCIN PHOSPHATE 900 MG/50ML
INJECTION, SOLUTION INTRAVENOUS
Status: DISCONTINUED
Start: 2023-12-28 | End: 2023-12-28 | Stop reason: HOSPADM

## 2023-12-28 RX ORDER — LIDOCAINE HYDROCHLORIDE 20 MG/ML
INJECTION, SOLUTION EPIDURAL; INFILTRATION; INTRACAUDAL; PERINEURAL PRN
Status: DISCONTINUED | OUTPATIENT
Start: 2023-12-28 | End: 2023-12-28 | Stop reason: SURG

## 2023-12-28 RX ORDER — LIDOCAINE AND PRILOCAINE 25; 25 MG/G; MG/G
CREAM TOPICAL ONCE
Status: DISCONTINUED | OUTPATIENT
Start: 2023-12-28 | End: 2023-12-28 | Stop reason: HOSPADM

## 2023-12-28 RX ORDER — ALPRAZOLAM 0.25 MG/1
.25-.5 TABLET ORAL
Status: DISCONTINUED | OUTPATIENT
Start: 2023-12-28 | End: 2023-12-28 | Stop reason: HOSPADM

## 2023-12-28 RX ORDER — HYDRALAZINE HYDROCHLORIDE 20 MG/ML
5 INJECTION INTRAMUSCULAR; INTRAVENOUS
Status: DISCONTINUED | OUTPATIENT
Start: 2023-12-28 | End: 2023-12-28 | Stop reason: HOSPADM

## 2023-12-28 RX ORDER — BUPIVACAINE HYDROCHLORIDE AND EPINEPHRINE 5; 5 MG/ML; UG/ML
INJECTION, SOLUTION EPIDURAL; INTRACAUDAL; PERINEURAL
Status: DISCONTINUED
Start: 2023-12-28 | End: 2023-12-28 | Stop reason: HOSPADM

## 2023-12-28 RX ORDER — ISOSULFAN BLUE 50 MG/5ML
INJECTION, SOLUTION SUBCUTANEOUS
Status: DISCONTINUED
Start: 2023-12-28 | End: 2023-12-28 | Stop reason: HOSPADM

## 2023-12-28 RX ORDER — ONDANSETRON 2 MG/ML
INJECTION INTRAMUSCULAR; INTRAVENOUS PRN
Status: DISCONTINUED | OUTPATIENT
Start: 2023-12-28 | End: 2023-12-28 | Stop reason: SURG

## 2023-12-28 RX ORDER — HYDROMORPHONE HYDROCHLORIDE 1 MG/ML
0.2 INJECTION, SOLUTION INTRAMUSCULAR; INTRAVENOUS; SUBCUTANEOUS
Status: DISCONTINUED | OUTPATIENT
Start: 2023-12-28 | End: 2023-12-28 | Stop reason: HOSPADM

## 2023-12-28 RX ORDER — LABETALOL HYDROCHLORIDE 5 MG/ML
5 INJECTION, SOLUTION INTRAVENOUS
Status: DISCONTINUED | OUTPATIENT
Start: 2023-12-28 | End: 2023-12-28 | Stop reason: HOSPADM

## 2023-12-28 RX ORDER — SODIUM CHLORIDE, SODIUM LACTATE, POTASSIUM CHLORIDE, CALCIUM CHLORIDE 600; 310; 30; 20 MG/100ML; MG/100ML; MG/100ML; MG/100ML
INJECTION, SOLUTION INTRAVENOUS CONTINUOUS
Status: DISCONTINUED | OUTPATIENT
Start: 2023-12-28 | End: 2023-12-28 | Stop reason: HOSPADM

## 2023-12-28 RX ORDER — DEXAMETHASONE SODIUM PHOSPHATE 4 MG/ML
INJECTION, SOLUTION INTRA-ARTICULAR; INTRALESIONAL; INTRAMUSCULAR; INTRAVENOUS; SOFT TISSUE PRN
Status: DISCONTINUED | OUTPATIENT
Start: 2023-12-28 | End: 2023-12-28 | Stop reason: SURG

## 2023-12-28 RX ORDER — ISOSULFAN BLUE 50 MG/5ML
INJECTION, SOLUTION SUBCUTANEOUS
Status: DISCONTINUED | OUTPATIENT
Start: 2023-12-28 | End: 2023-12-28 | Stop reason: HOSPADM

## 2023-12-28 RX ORDER — HYDROMORPHONE HYDROCHLORIDE 1 MG/ML
0.4 INJECTION, SOLUTION INTRAMUSCULAR; INTRAVENOUS; SUBCUTANEOUS
Status: DISCONTINUED | OUTPATIENT
Start: 2023-12-28 | End: 2023-12-28 | Stop reason: HOSPADM

## 2023-12-28 RX ORDER — SODIUM CHLORIDE, SODIUM LACTATE, POTASSIUM CHLORIDE, CALCIUM CHLORIDE 600; 310; 30; 20 MG/100ML; MG/100ML; MG/100ML; MG/100ML
INJECTION, SOLUTION INTRAVENOUS CONTINUOUS
Status: CANCELLED | OUTPATIENT
Start: 2023-12-28 | End: 2023-12-28

## 2023-12-28 RX ORDER — ONDANSETRON 2 MG/ML
4 INJECTION INTRAMUSCULAR; INTRAVENOUS
Status: DISCONTINUED | OUTPATIENT
Start: 2023-12-28 | End: 2023-12-28 | Stop reason: HOSPADM

## 2023-12-28 RX ORDER — OXYCODONE HCL 5 MG/5 ML
10 SOLUTION, ORAL ORAL
Status: COMPLETED | OUTPATIENT
Start: 2023-12-28 | End: 2023-12-28

## 2023-12-28 RX ORDER — CEFAZOLIN SODIUM 1 G/3ML
INJECTION, POWDER, FOR SOLUTION INTRAMUSCULAR; INTRAVENOUS
Status: DISCONTINUED
Start: 2023-12-28 | End: 2023-12-28 | Stop reason: HOSPADM

## 2023-12-28 RX ORDER — CELECOXIB 200 MG/1
200 CAPSULE ORAL ONCE
Status: COMPLETED | OUTPATIENT
Start: 2023-12-28 | End: 2023-12-28

## 2023-12-28 RX ORDER — SODIUM CHLORIDE, SODIUM LACTATE, POTASSIUM CHLORIDE, CALCIUM CHLORIDE 600; 310; 30; 20 MG/100ML; MG/100ML; MG/100ML; MG/100ML
INJECTION, SOLUTION INTRAVENOUS
Status: DISCONTINUED | OUTPATIENT
Start: 2023-12-28 | End: 2023-12-28 | Stop reason: SURG

## 2023-12-28 RX ORDER — HALOPERIDOL 5 MG/ML
1 INJECTION INTRAMUSCULAR
Status: DISCONTINUED | OUTPATIENT
Start: 2023-12-28 | End: 2023-12-28 | Stop reason: HOSPADM

## 2023-12-28 RX ORDER — OXYCODONE HCL 5 MG/5 ML
5 SOLUTION, ORAL ORAL
Status: COMPLETED | OUTPATIENT
Start: 2023-12-28 | End: 2023-12-28

## 2023-12-28 RX ORDER — HYDROMORPHONE HYDROCHLORIDE 1 MG/ML
0.1 INJECTION, SOLUTION INTRAMUSCULAR; INTRAVENOUS; SUBCUTANEOUS
Status: DISCONTINUED | OUTPATIENT
Start: 2023-12-28 | End: 2023-12-28 | Stop reason: HOSPADM

## 2023-12-28 RX ORDER — DIPHENHYDRAMINE HYDROCHLORIDE 50 MG/ML
12.5 INJECTION INTRAMUSCULAR; INTRAVENOUS
Status: DISCONTINUED | OUTPATIENT
Start: 2023-12-28 | End: 2023-12-28 | Stop reason: HOSPADM

## 2023-12-28 RX ORDER — MIDAZOLAM HYDROCHLORIDE 1 MG/ML
INJECTION INTRAMUSCULAR; INTRAVENOUS PRN
Status: DISCONTINUED | OUTPATIENT
Start: 2023-12-28 | End: 2023-12-28 | Stop reason: SURG

## 2023-12-28 RX ORDER — GENTAMICIN SULFATE 40 MG/ML
INJECTION, SOLUTION INTRAMUSCULAR; INTRAVENOUS
Status: DISCONTINUED
Start: 2023-12-28 | End: 2023-12-28 | Stop reason: HOSPADM

## 2023-12-28 RX ADMIN — FENTANYL CITRATE 25 MCG: 50 INJECTION, SOLUTION INTRAMUSCULAR; INTRAVENOUS at 10:32

## 2023-12-28 RX ADMIN — ONDANSETRON 4 MG: 2 INJECTION INTRAMUSCULAR; INTRAVENOUS at 10:16

## 2023-12-28 RX ADMIN — FENTANYL CITRATE 25 MCG: 50 INJECTION, SOLUTION INTRAMUSCULAR; INTRAVENOUS at 10:44

## 2023-12-28 RX ADMIN — FENTANYL CITRATE 25 MCG: 50 INJECTION, SOLUTION INTRAMUSCULAR; INTRAVENOUS at 10:29

## 2023-12-28 RX ADMIN — FENTANYL CITRATE 25 MCG: 50 INJECTION, SOLUTION INTRAMUSCULAR; INTRAVENOUS at 11:05

## 2023-12-28 RX ADMIN — FENTANYL CITRATE 25 MCG: 50 INJECTION, SOLUTION INTRAMUSCULAR; INTRAVENOUS at 09:55

## 2023-12-28 RX ADMIN — OXYCODONE HYDROCHLORIDE 10 MG: 5 SOLUTION ORAL at 11:51

## 2023-12-28 RX ADMIN — FENTANYL CITRATE 25 MCG: 50 INJECTION, SOLUTION INTRAMUSCULAR; INTRAVENOUS at 09:59

## 2023-12-28 RX ADMIN — DEXAMETHASONE SODIUM PHOSPHATE 8 MG: 4 INJECTION INTRA-ARTICULAR; INTRALESIONAL; INTRAMUSCULAR; INTRAVENOUS; SOFT TISSUE at 10:16

## 2023-12-28 RX ADMIN — FENTANYL CITRATE 25 MCG: 50 INJECTION, SOLUTION INTRAMUSCULAR; INTRAVENOUS at 10:10

## 2023-12-28 RX ADMIN — FENTANYL CITRATE 25 MCG: 50 INJECTION, SOLUTION INTRAMUSCULAR; INTRAVENOUS at 10:07

## 2023-12-28 RX ADMIN — SODIUM CHLORIDE, POTASSIUM CHLORIDE, SODIUM LACTATE AND CALCIUM CHLORIDE: 600; 310; 30; 20 INJECTION, SOLUTION INTRAVENOUS at 09:46

## 2023-12-28 RX ADMIN — FENTANYL CITRATE 25 MCG: 50 INJECTION, SOLUTION INTRAMUSCULAR; INTRAVENOUS at 11:33

## 2023-12-28 RX ADMIN — FENTANYL CITRATE 50 MCG: 50 INJECTION, SOLUTION INTRAMUSCULAR; INTRAVENOUS at 11:52

## 2023-12-28 RX ADMIN — MIDAZOLAM HYDROCHLORIDE 0.5 MG: 1 INJECTION, SOLUTION INTRAMUSCULAR; INTRAVENOUS at 09:55

## 2023-12-28 RX ADMIN — LIDOCAINE HYDROCHLORIDE 40 MG: 20 INJECTION, SOLUTION EPIDURAL; INFILTRATION; INTRACAUDAL at 09:50

## 2023-12-28 RX ADMIN — CLINDAMYCIN PHOSPHATE 900 MG: 150 INJECTION, SOLUTION INTRAMUSCULAR; INTRAVENOUS at 09:55

## 2023-12-28 RX ADMIN — FENTANYL CITRATE 25 MCG: 50 INJECTION, SOLUTION INTRAMUSCULAR; INTRAVENOUS at 11:22

## 2023-12-28 RX ADMIN — CELECOXIB 200 MG: 200 CAPSULE ORAL at 09:12

## 2023-12-28 RX ADMIN — ACETAMINOPHEN 1000 MG: 500 TABLET ORAL at 09:11

## 2023-12-28 RX ADMIN — PROPOFOL 150 MG: 10 INJECTION, EMULSION INTRAVENOUS at 09:50

## 2023-12-28 RX ADMIN — FENTANYL CITRATE 50 MCG: 50 INJECTION, SOLUTION INTRAMUSCULAR; INTRAVENOUS at 12:18

## 2023-12-28 RX ADMIN — MIDAZOLAM HYDROCHLORIDE 0.5 MG: 1 INJECTION, SOLUTION INTRAMUSCULAR; INTRAVENOUS at 09:50

## 2023-12-28 RX ADMIN — PROPOFOL 50 MG: 10 INJECTION, EMULSION INTRAVENOUS at 09:55

## 2023-12-28 RX ADMIN — FENTANYL CITRATE 25 MCG: 50 INJECTION, SOLUTION INTRAMUSCULAR; INTRAVENOUS at 11:26

## 2023-12-28 RX ADMIN — FENTANYL CITRATE 25 MCG: 50 INJECTION, SOLUTION INTRAMUSCULAR; INTRAVENOUS at 09:50

## 2023-12-28 ASSESSMENT — PAIN DESCRIPTION - PAIN TYPE
TYPE: SURGICAL PAIN
TYPE: ACUTE PAIN;SURGICAL PAIN
TYPE: SURGICAL PAIN
TYPE: ACUTE PAIN;SURGICAL PAIN

## 2023-12-28 ASSESSMENT — PAIN SCALES - GENERAL: PAIN_LEVEL: 5

## 2023-12-28 NOTE — ANESTHESIA POSTPROCEDURE EVALUATION
Patient: Nadine Hurd    Procedure Summary       Date: 12/28/23 Room / Location: [facility] ROOM 28 / SURGERY SAME DAY [facility]    Anesthesia Start: 0946 Anesthesia Stop: 1147    Procedures:       RIGHT PARTIAL MASTECTOMY (Right: Breast)      RIGHT BREAST RECONSTRUCTION WITH DERMAL GLANDULAR FLAPS, BILATERAL BREAST REDUCTION FOR SYMMETRY. (Bilateral: Breast)      FLAP GRAFT (Right: Breast)      MAMMOPLASTY, REDUCTION (Bilateral: Breast) Diagnosis: (MALIGNANT NEOPLASM OF UPPER OUTER QUADRANT OF RIGHT BREAST)    Surgeons: Maria Dolores May M.D.; Terrell Lucia M.D. Responsible Provider: Janey Sellers M.D.    Anesthesia Type: general ASA Status: 2            Final Anesthesia Type: general  Last vitals  BP   Blood Pressure : 100/67    Temp   37.1 °C (98.8 °F)    Pulse   70   Resp   16    SpO2   94 %      Anesthesia Post Evaluation    Patient location during evaluation: PACU  Patient participation: complete - patient participated  Level of consciousness: awake and alert  Pain score: 5    Airway patency: patent  Anesthetic complications: no  Respiratory status: acceptable  Hydration status: acceptable    PONV: none          No notable events documented.     Nurse Pain Score: 5 (NPRS)

## 2023-12-28 NOTE — ANESTHESIA PREPROCEDURE EVALUATION
Case: [id number] Date/Time: 12/28/23 0915    Procedures:       RIGHT PARTIAL MASTECTOMY, RIGHT SENTINEL LYMPH NODE INJECTION WITH EXCISION OF AXILLARY NODES      BIOPSY, LYMPH NODE, SENTINEL      RIGHT BREAST RECONSTRUCTION WITH DERMAL GLANDULAR FLAPS, BILATERAL BREAST REDUCTION FOR SYMMETRY.      FLAP GRAFT      MAMMOPLASTY, REDUCTION    Pre-op diagnosis:       RIGHT UPPER OUTER BREAST CANCER      PERSONAL HISTORY OF MALIGNANT NEOPLASM OF BREAST, MALIGNANT NEOPLASM OF UPPER OUTER QUADRANT OF RIGHT BREAST    Location: [facility] ROOM 28 / SURGERY SAME DAY [facility]    Surgeons: Maria Dolores May M.D.; Terrell Lucia M.D.            Relevant Problems   No relevant active problems       Physical Exam    Airway   Mallampati: I  TM distance: >3 FB       Cardiovascular   Rhythm: regular  Rate: normal     Dental - normal exam      Very poor dentition     Pulmonary   Breath sounds clear to auscultation     Abdominal - normal exam     Neurological                Anesthesia Plan    ASA 2       Plan - general       Airway plan will be LMA          Induction: intravenous    Postoperative Plan: Postoperative administration of opioids is intended.    Pertinent diagnostic labs and testing reviewed    Informed Consent:    Anesthetic plan and risks discussed with patient.    Use of blood products discussed with: whom consented to blood products.

## 2023-12-28 NOTE — OP REPORT
Operative Report    Date: 12/28/2023      Pre-operative Diagnosis: Malignant neoplasm right upper outer breast    Post-operative Diagnosis: Same    Procedure:   Right partial mastectomy  Right sentinel lymph node mapping (no axillary nodes removed)      Surgeon: Maria Dolores May M.D.    Assistant: Terrell Lucia MD    Anesthesiologist: Janey Sellers MD      Anesthesia:  General    Pre-Op Meds:  Clindamycin (pt has been tolerating oral clindamycin without any problems)    ASA class:  2    Indications:   This is a 68 year old female with right breast cancer identified after excisional biopsy.  This is a pT2 cN0 M0 invasive lobular cancer.  Margins were positive and MRI showed the expected seroma, but no suspicious findings.  After discussing risks and benefits of her options at length, she is ready to proceed with surgery.  She developed a faint dependent central erythema, centered over her NAC, but without erythema at the prior incision.  Non-purulent, serosanguinous fluid was aspirated two days ago, and cultures remain negative thus far.  She was placed empirically on Clindamycin at the time, in anticipation of today's surgery, which she has tolerated without notable side effects.  No sign of active infection clinically.  With her  present, we reviewed the role of lymphatic mapping and the possibility of not identifying any sentinel node.  Given her options of proceeding with full axillary dissection versus avoiding lymph node removal, she opted for the latter.      Findings:   Prior excision cavity completely excised, including focal additional posterior (at disrupted margin), additional lateral margin, anterior margin (5 x 5cm including skin) and a focal area of inferior margin (part of the lateral triangle of skin and tissue excised by Dr. Lucia).  Skin is now the lateral margin, lateral pectoralis is now the posterior margin.      Summary:     The patient underwent radiotracer injection by Radiology.   She was taken to the operating room and anesthetized, prepped, and draped in sterile fashion.  Time out was confirmed.  I injected 5cc of isosulfan blue dye with 15cc of saline into the subareolar region and in the upper outer breast, and lymphatic massage was performed.      After Dr. Lucia made his periareolar incisions, which were skewed laterally to excise the prior upper outer scar, I dissected along the anterior mammary fascial plan to the previous resection cavity.  The cavity was entered at the anterior aspect, and I resected widely around this cavity.  Usual orienting sutures were placed, and the anterior disrupted margin was reapproximated with a running vicryl.  Specimen mammogram was performed.      Additional margins were taken from the lateral and focal posterior areas to clear the margins completely in the lateral and posterior aspect.  Two of Dr. Lucia's additional resections also included margins as described above.  I irrigated and ensured hemostasis.  Small clips were placed along the posterior margin and larger clips were placed at the parenchymal edges.      I then dissected along the retromammary fascial plane toward the axilla and dissected through her clavipectoral fascia.  The gamma probe was used but no sentinel node was identified either by radiocounts or by blue lymphatics.  Given our pre-op discussion on this exact scenario, and also given her overall very favorable tumor biology, I felt it was reasonable to abide by her wishes and avoid lymph node removal.   I irrigated, ensured hemostasis, then Dr. Lucia completed his portion of the procedure which is dictated separately.        [place] Node Biopsy for Breast Cancer  Operation performed with curative intent Yes   Tracer(s) used to identify sentinel nodes in the upfront surgery (non-neoadjuvant) setting Dye and Radioactive tracer   Tracer(s) used to identify sentinel nodes in the neoadjuvant setting N/A   All nodes (colored or  non-colored) present at the end of a dye-filled lymphatic channel were removed N/A   All significantly radioactive nodes were removed N/A   All palpably suspicious nodes were removed N/A   Biopsy-proven positive nodes marked with clips prior to chemotherapy were identified and removed N/A        CC:   MD Dru Harrington MD

## 2023-12-28 NOTE — ANESTHESIA TIME REPORT
Anesthesia Start and Stop Event Times       Date Time Event    12/28/2023 0939 Ready for Procedure     0946 Anesthesia Start     1147 Anesthesia Stop          Responsible Staff  12/28/23      Name Role Begin End    Janey Sellers M.D. Anesth 0946 1147          Overtime Reason:  no overtime (within assigned shift)    Comments:

## 2023-12-28 NOTE — OP REPORT
DATE OF SERVICE:  12/28/2023     PREOPERATIVE DIAGNOSIS:  Right breast invasive lobular carcinoma.     POSTOPERATIVE DIAGNOSIS:  Right breast invasive lobular carcinoma.     PROCEDURES:  1.  Right breast reconstruction with dermoglandular flaps, 65 cm2.  2.  Right breast reduction using inferior pedicle and a Wise pattern skin   excision.  3.  Left breast reduction for symmetry using a superior medial pedicle and a   Wise pattern skin resection.     ATTENDING SURGEON:  Terrell Lucia MD     ANESTHESIOLOGIST:  Janey Sellers MD     ASSISTANT:  Maria Dolores May MD, MD     ESTIMATED BLOOD LOSS:  Minimal.     SPECIMENS:  1.  Left superior breast reduction tissue.  2.  Left inferior breast reduction tissue, short stitch superior, long stitch   lateral and there were numerous specimens on the right breast per Dr. May.     COMPLICATIONS:  No apparent.     INDICATIONS FOR PROCEDURE:  The patient is a 68-year-old woman who was   diagnosed with right breast invasive lobular carcinoma.  She had underwent   excisional biopsy on 11/28/2023 and was found to have a positive margins.  She   had recommendations to undergo reexcision and due to the anticipated defect,   plastic surgery was consulted to assist in reconstruction.  The patient also   has large ptotic breast with breast asymmetry with her right breast being   larger than her left breast.  She now presents for the above operation.     INTRAOPERATIVE FINDINGS:  The right breast reduction tissue weighed a total of   348 grams.  The left breast reduction tissue 162 grams.     DESCRIPTION OF PROCEDURE:  After the operative and nonoperative options were   discussed including risks, benefits and alternatives, which included but was   not limited to bleeding, infection, damage to surrounding structure, need for   further surgery, reaction to anesthetic agent, scarring, breast asymmetry,   contour irregularities, wound healing difficulties, need for revisional   surgery,  breakdown at the inverted T junction, change in nipple sensation,   loss of nipple sensation, loss of nipple, hypertrophic keloid scarring,   dissatisfaction with breast size, shape or appearance, pneumothorax, deep   venous thrombosis, pulmonary embolus, myocardial infarction, stroke,   unsatisfactory result and/or death, informed consent was obtained.    Preoperatively, the patient was identified.  In a sitting upright position,   the patient's sternal notch, midline and inframammary folds were marked.  The   patient had a previous excisional biopsy scar in the upper lateral aspect of   the right breast.  This was incorporated in the Vasques pattern incisions by   shifting the vertical and the lateral vertical limb more laterally.  An   inferior pedicle was drawn out.  On the left breast, a superior medial pedicle   was drawn out.  The new location of nipple areolar complexes were marked 22.5   cm from the sternal notch.  The vertical limbs were drawn at 8 cm.  On the   left breast, the horizontal limb at 7 cm, on the right breast at 8.5 cm.    Antibiotics were given.  Sequential compression devices were placed.  The   patient was brought to the operating room.  General anesthesia was induced.    Her chest was prepped and draped in the usual sterile fashion.  Please see Dr. Maria Dolores May's note for the partial mastectomy on the right breast.    Following completion of this, then the defect was inspected.  The patient was   missing the upper lateral aspect of her breast and had significant contour   irregularity.  At this point, then a 38 mm areolar sizer was used.  An   inferior pedicle was then deepithelialized.  The Wise pattern incisions were   made.  The breast flaps were then elevated off the medial aspect of the breast   and superior medial bilateral traction.  After doing this, then a laterally   based dermoglandular flap was then created by mobilizing tissue after   de-epithelializing the tissue.  The tissue  was then rotated in an inferior   lateral direction to superior medial direction putting a portion of the   parenchymal tissue underneath the nipple areolar complex.  In doing so, it   gave more support and filled the area of the partial mastectomy defect.  The   area involved was about 65 cm2.  This was then tacked down to the chest wall   with 2-0 Vicryl sutures.  Following this, then the inferior pedicle was then   deepithelialized using a 10 blade and Bovie electrocautery.  Then, the breast   reduction was performed by removing tissue along the superior aspect of the   breast and this was sent for permanent evaluation.  Then, tissue was removed   from the inferior medial aspect of the breast and was then marked and sent for   permanent evaluation.  Following this, then tissue was then further mobilized   with Bovie electrocautery.  A number of parenchymal sutures were then placed   with 2-0 Vicryl sutures.  Intercostal blocks were placed for 0.5% Marcaine   with epinephrine for postoperative pain control.  A 15-Ukrainian round drain was   placed and secured with 3-0 nylon suture.  Then, the inverted T junction and   the base areola were brought together with 2-0 Vicryl sutures.  The dermis was   then closed with 3-0 deep dermal Monocryl sutures and running 4-0 Monocryl   subcuticular stitches to close the overlying skin.     We then turned attention to the left breast.  On this side, a 38 mm areolar   sizer was used.  The Wise pattern incisions were made.  The superior medial   pedicle was then deepithelialized with a 10 blade and Bovie electrocautery.    Cautery was used to dissect around the pedicle, removing a superior breast   tissue and this was then sent separately and then down to the chest wall   inferiorly removing the entire inferior pole of the breast.  This was marked   with short superior, long stitch lateral and sent to pathology.  Then, the   pedicle was then further mobilized.  Then, 0.5% Marcaine  with epinephrine was   then placed for postoperative pain control.  On this side, they did not feel a   drain was needed.  Then, the breast reduction flaps were then further   mobilized.  Then, the inverted T junction and the base areola were then   brought together with 2-0 Vicryl sutures.  The dermis was then closed with 3-0   deep dermal Monocryl sutures and running 4-0 Monocryl subcuticular stitch was   used to close the overlying skin.  The patient was then washed.  Steri-Strips   and compressive dressing was then placed.  The patient was then awakened,   extubated and transferred to PACU in stable condition.  At the end of the   procedure, all sponge, instrument and needle counts were correct.        ______________________________  MD ELVIN ESPINOSA/KAT/JAN    DD:  12/28/2023 11:48  DT:  12/28/2023 12:51    Job#:  [id number]

## 2023-12-28 NOTE — OR NURSING
Report received from Paty RUFF.     1308: Pt arrived to bay 19 and ambulated to recliner with assistance. Charles drain in place, breast binder in place.     1317  to bedside     1321 Discharge instructions reviewed with family at bedside, verbalize understanding and all questions answered. Charles drain instructions given, drainage cup to measure. Pt then changed into clothing with assistance.     1349 pt up to bathroom     1403: IV and ID bands removed. Pt escorted to lobby via wheelchair, accompanied by CNA. All personal belongings and discharge instructions with patient.

## 2023-12-28 NOTE — ANESTHESIA PROCEDURE NOTES
Airway    Date/Time: 12/28/2023 10:00 AM    Performed by: Janey Sellers M.D.  Authorized by: Janey Sellers M.D.    Location:  OR  Urgency:  Elective  Difficult Airway: No    Indications for Airway Management:  Anesthesia      Spontaneous Ventilation: present    Sedation Level:  Deep  Preoxygenated: Yes    Patient Position:  Sniffing  MILS Maintained Throughout: No    Mask Difficulty Assessment:  1 - vent by mask  Final Airway Type:  Supraglottic airway  Final Supraglottic Airway:  Standard LMA    SGA Size:  4  Number of Attempts at Approach:  1

## 2023-12-28 NOTE — OR NURSING
1143 - Pt to PACU from OR. Report from anesthesia and OR RN. On 6L O2 via mask. Respirations even and unlabored. VSS.     1145 - Pt on RA, tolerating sips of water    1152 - Pt medicated for pain per MAR. Pt placed on 2L o2 via NC    1218 - Pt medicated for pain per MAR    1230 - Update provided to pt spouse via telephone    0105 - Handoff to Letitia RUFF in phase II    0107 - Pt transported to phase II on rney by RN. VSS, NAD. Belongings with pt.

## 2023-12-28 NOTE — OR SURGEON
Immediate Post OP Note    PreOp Diagnosis: right breast cancer, macromastia, breast asymmetry      PostOp Diagnosis: same      Procedure(s):  RIGHT PARTIAL MASTECTOMY - Wound Class: Clean with Drain  RIGHT BREAST RECONSTRUCTION WITH DERMAL GLANDULAR FLAPS, BILATERAL BREAST REDUCTION FOR SYMMETRY. - Wound Class: Clean with Drain  FLAP GRAFT - Wound Class: Clean with Drain  MAMMOPLASTY, REDUCTION - Wound Class: Clean with Drain    Surgeon(s):  SACHIN Mckeon M.D.    Anesthesiologist/Type of Anesthesia:  Anesthesiologist: Janey Sellers M.D./General    Surgical Staff:  Circulator: Angelia Banks R.N.  Scrub Person: Selin Hartley; Maribeth Celaya; Johanna Ferro    Specimens removed if any:  ID Type Source Tests Collected by Time Destination   A : left superior breast reduction tissue Tissue Breast PATHOLOGY SPECIMEN Terrell Lucia M.D. 12/28/2023 10:17 AM    B : left inferior breast reduction tissue; short superior, long lateral Tissue Breast PATHOLOGY SPECIMEN Terrell Lucia M.D. 12/28/2023 10:20 AM    C : right partial mastectomy; short superior, long lateral Tissue Breast PATHOLOGY SPECIMEN Maria Dolores May M.D. 12/28/2023 10:32 AM    D : right final posterior margin in area of disrupted posterior margin; suture marks new margin Tissue Breast PATHOLOGY SPECIMEN Maria Dolores May M.D. 12/28/2023 10:35 AM    E : right final lateral margin; suture marks new margin Tissue Breast PATHOLOGY SPECIMEN Maria Dolores May M.D. 12/28/2023 10:53 AM    F : right anterior margin, suture marks superior aspect, skin is true anterior Tissue Breast PATHOLOGY SPECIMEN Maria Dolores May M.D. 12/28/2023 11:00 AM    G : right medial triangle remote from tumor site, suture marks medial Tissue Breast PATHOLOGY SPECIMEN Maria Dolores May M.D. 12/28/2023 11:05 AM    H : right lateral triangle, suture marks lateral margin, ink marks inferior focal margin of resection cavity Tissue Breast PATHOLOGY SPECIMEN  Maria Dolores May M.D. 12/28/2023 11:10 AM        Estimated Blood Loss: minimal    Findings: see operative note    Complications: no apparent    #[id number]        12/28/2023 11:40 AM Terrell Lucia M.D.

## 2023-12-28 NOTE — DISCHARGE INSTRUCTIONS
If any questions arise, call your provider.  If your provider is not available, please feel free to call the Surgical Center at [phone].    MEDICATIONS: Resume taking daily medication.  Take prescribed pain medication with food.  If no medication is prescribed, you may take non-aspirin pain medication if needed.  PAIN MEDICATION CAN BE VERY CONSTIPATING.  Take a stool softener or laxative such as senokot, pericolace, or milk of magnesia if needed.    Last pain medication given at 11:51.    What to Expect Post Anesthesia    Rest and take it easy for the first 24 hours.  A responsible adult is recommended to remain with you during that time.  It is normal to feel sleepy.  We encourage you to not do anything that requires balance, judgment or coordination.    FOR 24 HOURS DO NOT:  Drive, operate machinery or run household appliances.  Drink beer or alcoholic beverages.  Make important decisions or sign legal documents.    To avoid nausea, slowly advance diet as tolerated, avoiding spicy or greasy foods for the first day.  Add more substantial food to your diet according to your provider's instructions.  Babies can be fed formula or breast milk as soon as they are hungry.  INCREASE FLUIDS AND FIBER TO AVOID CONSTIPATION.    MILD FLU-LIKE SYMPTOMS ARE NORMAL.  YOU MAY EXPERIENCE GENERALIZED MUSCLE ACHES, THROAT IRRITATION, HEADACHE AND/OR SOME NAUSEA.    Special Instruction:    Please record drain output on sheet provided.  Walk regularly.  Avoid strenuous arm activity and abrupt arm movements; limit to necessary activities of daily living. Please follow instructions provided by office. You may stop Clindamycin. Call Dr. Lucia tomorrow for shower instructions.

## 2023-12-29 LAB
BACTERIA WND AEROBE CULT: NORMAL
GRAM STN SPEC: NORMAL
SIGNIFICANT IND 70042: NORMAL
SITE SITE: NORMAL
SOURCE SOURCE: NORMAL

## 2023-12-31 LAB
BACTERIA SPEC ANAEROBE CULT: NORMAL
SIGNIFICANT IND 70042: NORMAL
SITE SITE: NORMAL
SOURCE SOURCE: NORMAL

## 2024-01-03 ENCOUNTER — OFFICE VISIT (OUTPATIENT)
Dept: SURGERY | Facility: MEDICAL CENTER | Age: 69
End: 2024-01-03

## 2024-01-03 VITALS
BODY MASS INDEX: 29.82 KG/M2 | WEIGHT: 190 LBS | HEART RATE: 68 BPM | OXYGEN SATURATION: 94 % | DIASTOLIC BLOOD PRESSURE: 80 MMHG | HEIGHT: 67 IN | TEMPERATURE: 97.8 F | SYSTOLIC BLOOD PRESSURE: 124 MMHG

## 2024-01-03 DIAGNOSIS — C50.411 CARCINOMA OF UPPER-OUTER QUADRANT OF FEMALE BREAST, RIGHT (HCC): ICD-10-CM

## 2024-01-03 PROCEDURE — 3079F DIAST BP 80-89 MM HG: CPT | Performed by: SURGERY

## 2024-01-03 PROCEDURE — 3074F SYST BP LT 130 MM HG: CPT | Performed by: SURGERY

## 2024-01-03 PROCEDURE — 99024 POSTOP FOLLOW-UP VISIT: CPT | Performed by: SURGERY

## 2024-01-03 RX ORDER — CLINDAMYCIN HYDROCHLORIDE 300 MG/1
CAPSULE ORAL
COMMUNITY
Start: 2023-12-26 | End: 2024-02-02

## 2024-01-03 NOTE — PROGRESS NOTES
"1/3/2024  7:54 AM    Primary care provider:  Dru Conroy M.D.  Plastic surgeon:  Dr. Lucia   Imaging facility:  [facility]    CC:   Chief Complaint   Patient presents with    Post-op        HPI: This very pleasant 68 y.o. female returns with her  Jet for routine postoperative appointment.  She did not take any opioids for pain and felt things were very manageable.  She is glad in retrospect that she underwent reconstruction.      Allergies   Allergen Reactions    Bloodless Unspecified     Jehovah witness    Ceclor [Cefaclor] Anaphylaxis    Food Dye Swelling     Throat swells-pt unsure if there is a particular color food dye that she has a reaction to specifically        Other Environmental Anaphylaxis     Bees    Clindamycin Rash     rash    Codeine Nausea     Nausea and vomiting    Epinephrine Unspecified     tachycardia    Other Misc Rash     Tape    Penicillins Itching    Zinc Swelling     Topical zinc oxide causes swelling and blisters to area       Current Outpatient Medications   Medication Sig    clindamycin (CLEOCIN) 300 MG Cap     levothyroxine (EUTHYROX) 75 MCG Tab Take 1 Tablet by mouth every day.       Physical Exam:  /80 (BP Location: Left arm, Patient Position: Sitting, BP Cuff Size: Large adult)   Pulse 68   Temp 36.6 °C (97.8 °F) (Temporal)   Ht 1.702 m (5' 7\")   Wt 86.2 kg (190 lb)   LMP 10/05/2013 (Approximate) Comment: Age of first period: 9  SpO2 94%   BMI 29.76 kg/m²     General: Very pleasant demeanor.  Appears well, no acute distress.  Surgical site:  Bilateral Wise pattern incisions are healing well.  Minimal depression of the right upper outer quadrant, site of resection.  No infection or hematoma.   See scanned diagram    1. 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  Pathologic Stage pT2 cN0 M0.  Core biopsies showed ALH " (Dr. Bello).  Excisional biopsy 11/28/23.  Cultures from seroma 12/26/23 negative.  Right partial mastectomy, SLN mapping without identification, LTR/CM by Dr. Lucia 12/28/23.     Residual ILC present in four sections around the prior excision site.  FINAL MARGINS CLEAR.  0.5cm from anterior on main specimen, additional anterior margins negative.  (2.1cm on excision, 2cm on exam.  Vague sonographic finding measured to be almost 4cm.  Mammo shows a discrete asymmetry/AD 1.6cm).              Grade 1.  LVI not seen.  ER >90%.  CA 81-90%.  Ki-67 <10%.    HER2 negative by IHC.  No sentinel node identified despite dual tracer.  Per our pre-op discussion, she declined additional node removal (ALND).      Left reduction tissue benign.       Screening mammo 2/15/23 RDC:  Scattered FGD.      Paternal aunt with breast cancer (50).  Other FH for colon and rectal cancer, uterine cancer, brother with brain tumor.  Referred to Genetics, not yet scheduled.       No smoking or alcohol history.  BMI 29.  Hindu.  Refuses blood products.      DISCUSSED/PLAN:  We discussed the pathology report which she already reviewed.  We will refer her to Medical and Radiation Oncology to discuss adjuvant options.  She requests a location [place] [facility].      She does plan to proceed with Genetics, but had put this on hold due to her 's health issues.  I advised her to walk regularly.  She has ongoing follow up with her plastic surgeon and we will defer lifting of restrictions and return to normal activity/work to his discretion. I will see her back in about 4 weeks.

## 2024-01-12 ENCOUNTER — APPOINTMENT (OUTPATIENT)
Dept: RADIOLOGY | Facility: MEDICAL CENTER | Age: 69
End: 2024-01-12
Attending: GENERAL PRACTICE
Payer: MEDICARE

## 2024-02-02 ENCOUNTER — OFFICE VISIT (OUTPATIENT)
Dept: SURGERY | Facility: MEDICAL CENTER | Age: 69
End: 2024-02-02
Payer: MEDICARE

## 2024-02-02 VITALS
HEIGHT: 67 IN | OXYGEN SATURATION: 96 % | WEIGHT: 188 LBS | DIASTOLIC BLOOD PRESSURE: 78 MMHG | TEMPERATURE: 98.7 F | BODY MASS INDEX: 29.51 KG/M2 | HEART RATE: 76 BPM | SYSTOLIC BLOOD PRESSURE: 120 MMHG

## 2024-02-02 DIAGNOSIS — C50.411 CARCINOMA OF UPPER-OUTER QUADRANT OF FEMALE BREAST, RIGHT (HCC): ICD-10-CM

## 2024-02-02 PROCEDURE — 99024 POSTOP FOLLOW-UP VISIT: CPT | Performed by: SURGERY

## 2024-02-02 NOTE — PROGRESS NOTES
"2/2/2024  8:37 AM    Primary care provider:  Dru Conroy M.D.  Medical oncologist:  Dr. Gonzalez  Radiation oncologist:  Dr. Jayden Rivera (PRO)  Plastic surgeon:  Dr. Lucia   Imaging facility:  [facility]    CC:   Chief Complaint   Patient presents with    Post-op        HPI: This very pleasant 68 y.o. female returns for routine postoperative appointment.  She is very pleased with her aesthetic outcome from surgery and has no concerns about the surgical sites.  She met with Dr. Rivera yesterday and has an appointment with Dr. Gonzalez Monday.  She plans to proceed with genetic testing as well.        Allergies   Allergen Reactions    Bloodless Unspecified     Jehovah witness    Ceclor [Cefaclor] Anaphylaxis    Food Dye Swelling     Throat swells-pt unsure if there is a particular color food dye that she has a reaction to specifically        Other Environmental Anaphylaxis     Bees    Clindamycin Rash     rash    Codeine Nausea     Nausea and vomiting    Epinephrine Unspecified     tachycardia    Other Misc Rash     Tape    Penicillins Itching    Zinc Swelling     Topical zinc oxide causes swelling and blisters to area       Current Outpatient Medications   Medication Sig    levothyroxine (EUTHYROX) 75 MCG Tab Take 1 Tablet by mouth every day.     Physical Exam:  /78 (BP Location: Left arm, Patient Position: Sitting, BP Cuff Size: Large adult)   Pulse 76   Temp 37.1 °C (98.7 °F) (Temporal)   Ht 1.702 m (5' 7\")   Wt 85.3 kg (188 lb)   LMP 10/05/2013 (Approximate) Comment: Age of first period: 9  SpO2 96%   BMI 29.44 kg/m²     General: Very pleasant demeanor.  Appears well, no acute distress.  Surgical site:  Bilateral medial dependent hyperemia without evidence of infection or hematoma.   See scanned diagram    1. 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  " "Pathologic Stage pT2 cN0 M0.  Core biopsies showed ALH (Dr. Bello).  Excisional biopsy 11/28/23.  Cultures from seroma 12/26/23 negative.  Right partial mastectomy, SLN mapping without identification, LTR/CM by Dr. Lucia 12/28/23.                2.1cm on excision.  Residual ILC present in four sections around the prior excision site.  FINAL MARGINS CLEAR.  0.5cm from anterior on main specimen, additional anterior margins negative.  (2cm on exam.  Vague sonographic finding measured to be almost 4cm.  Mammo shows a discrete asymmetry/AD 1.6cm).              Grade 1.  LVI not seen.  ER >90%.  [place] 81-90%.  Ki-67 <10%.    HER2 negative by IHC.  No sentinel node identified despite dual tracer.  Per our pre-op discussion, she declined additional node removal (ALND).       Left reduction tissue benign.       Screening mammo 2/15/23 [facility]:  Scattered FGD.      Paternal aunt with breast cancer (50).  Other FH for colon and rectal cancer, uterine cancer, brother with brain tumor.  Referred to Genetics, not yet scheduled.       No smoking or alcohol history.  BMI 29.  Confucianism.  Refuses blood products.     DISCUSSED/PLAN:  She had many questions about her culture results (negative, but \"rare WBC\" made her think that she had a rare WBC) and the failed lymphatic mapping.  However, she still feels it was the right choice to avoid full axillary node dissection.  She was a little alarmed that her genetic testing might mandate bilateral total mastectomies, but I assured her that a BRCA1 or BRCA2 mutation would simply invite discussion about options - surgery would not be automatically assumed.      She is actually due for annual mammogram now, but she just had surgery and it remains unclear when she will start radiation therapy, as she meets with Dr. Gonzalez Monday.  I reassured her that Dr. Rivera could guide her regarding mammograms, as much depends on when she starts or completes radiation, as well as whether any " chemotherapy is felt to be necessary.      I asked her to contact us in about 4 months to arrange a FU appointment in 6 months, and encouraged her to call with concerning changes in the interim.  However, if she prefers to streamline her appointments and decrease potential COVID exposure, as well as financial burden or other reasons, she may opt to continue FU with her other oncologists and return here as needed.  She expresses sincere appreciation for her care.

## 2024-03-01 ENCOUNTER — APPOINTMENT (OUTPATIENT)
Dept: RADIOLOGY | Facility: MEDICAL CENTER | Age: 69
End: 2024-03-01
Attending: GENERAL PRACTICE
Payer: MEDICARE

## 2024-04-19 ENCOUNTER — HOSPITAL ENCOUNTER (OUTPATIENT)
Dept: RADIOLOGY | Facility: MEDICAL CENTER | Age: 69
End: 2024-04-19
Attending: GENERAL PRACTICE
Payer: COMMERCIAL

## 2024-04-19 DIAGNOSIS — M81.0 OSTEOPOROSIS, UNSPECIFIED OSTEOPOROSIS TYPE, UNSPECIFIED PATHOLOGICAL FRACTURE PRESENCE: ICD-10-CM

## 2024-04-19 PROCEDURE — 77080 DXA BONE DENSITY AXIAL: CPT

## 2024-04-25 ENCOUNTER — APPOINTMENT (OUTPATIENT)
Dept: RADIOLOGY | Facility: MEDICAL CENTER | Age: 69
End: 2024-04-25
Attending: GENERAL PRACTICE
Payer: MEDICARE

## (undated) DEVICE — MASK OXYGEN VNYL ADLT MED CONC WITH 7 FOOT TUBING  - (50EA/CA)

## (undated) DEVICE — SUCTION INSTRUMENT YANKAUER BULBOUS TIP W/O VENT (50EA/CA)

## (undated) DEVICE — SODIUM CHL IRRIGATION 0.9% 1000ML (12EA/CA)

## (undated) DEVICE — SUTURE 2-0 VICRYL PLUS SH - 8 X 18 INCH (12/BX)

## (undated) DEVICE — SUTURE 3-0 MONOCRYL PLUS PS-2 - (12/BX)

## (undated) DEVICE — COVER CIV-FLEX TRANSDUCER - (24/BX)

## (undated) DEVICE — PAD MAGNETIC INSTRUMENT FOAM HOLDER (200/CA)

## (undated) DEVICE — BLADE ELECTRODE COATED EDGE (50EA/PK)

## (undated) DEVICE — SENSOR OXIMETER ADULT SPO2 RD SET (20EA/BX)

## (undated) DEVICE — CLOSURE SKIN STRIP 1/2 X 4 IN - (STERI STRIP) (50/BX 4BX/CA)

## (undated) DEVICE — GLOVE LITE HANDLE DISP. - (1/PK 80PK/CS)

## (undated) DEVICE — RESERVOIR SUCTION 100 CC - SILICONE (20EA/CA)

## (undated) DEVICE — BINDER BREAST FLORAL MINT XL 40-45"

## (undated) DEVICE — Device

## (undated) DEVICE — DRESSING TRANSPARENT FILM TEGADERM 2.375 X 2.75"  (100EA/BX)"

## (undated) DEVICE — SHEET TRANSVERSE LAP - (12EA/CA)

## (undated) DEVICE — SET LEADWIRE 5 LEAD BEDSIDE DISPOSABLE ECG (1SET OF 5/EA)

## (undated) DEVICE — CLIP MED INTNL HRZN TI ESCP - (25/BX)

## (undated) DEVICE — PLUMEPEN ULTRA 3/8 IN X 10 FT HOSE (20EA/CA)

## (undated) DEVICE — SUTURE 3-0 VICRYL PLUS SH - 8X 18 INCH (12/BX)

## (undated) DEVICE — CANNULA O2 COMFORT SOFT EAR ADULT 7 FT TUBING (50/CA)

## (undated) DEVICE — TUBING CLEARLINK DUO-VENT - C-FLO (48EA/CA)

## (undated) DEVICE — KIT  I.V. START (100EA/CA)

## (undated) DEVICE — MASK AIRWAY SIZE 3 UNIQUE SILICON (10/BX)

## (undated) DEVICE — SLEEVE VASO CALF MED - (10PR/CA)

## (undated) DEVICE — MEDICINE CUP STERILE 2 OZ - (100/CA)

## (undated) DEVICE — TUBE CONNECTING SUCTION - CLEAR PLASTIC STERILE 72 IN (50EA/CA)

## (undated) DEVICE — CANISTER SUCTION 3000ML MECHANICAL FILTER AUTO SHUTOFF MEDI-VAC NONSTERILE LF DISP  (40EA/CA)

## (undated) DEVICE — GOWN WARMING STANDARD FLEX - (30/CA)

## (undated) DEVICE — SUTURE 4-0 MONOCRYL PLUS PS-2 - 27 INCH (36/BX)

## (undated) DEVICE — GLOVE BIOGEL INDICATOR SZ 6.5 SURGICAL PF LTX - (50PR/BX 4BX/CA)

## (undated) DEVICE — BINDER BREAST FLORAL LAVENDER XL 40-45"

## (undated) DEVICE — LACTATED RINGERS INJ 1000 ML - (14EA/CA 60CA/PF)

## (undated) DEVICE — GLOVE BIOGEL SZ 6 PF LATEX - (50EA/BX 4BX/CA)

## (undated) DEVICE — SUTURE GENERAL

## (undated) DEVICE — GLOVE BIOGEL INDICATOR SZ 8.5 SURGICAL PF LTX - (50/BX 4BX/CA)

## (undated) DEVICE — DRESSING TRANSPARENT FILM TEGADERM 4 X 4.75" (50EA/BX)"

## (undated) DEVICE — CANISTER SUCTION RIGID RED 1500CC (40EA/CA)

## (undated) DEVICE — TOWEL STOP TIMEOUT SAFETY FLAG (40EA/CA)

## (undated) DEVICE — PACK BREAST RECONSTRUCTION (2EA/CA)

## (undated) DEVICE — DRAIN JACKSON PRATT 15FR - (10EA/CA)

## (undated) DEVICE — CLIP APPLIER OPEN SMALL (6EA/BX)

## (undated) DEVICE — TUBING TUMESENCE - 10/BX

## (undated) DEVICE — SPONGE GAUZESTER. 2X2 4-PL - (2/PK 50PK/BX 30BX/CS)

## (undated) DEVICE — DRESSING ANTIMICROBIAL BIOPATCH 4.0M (40EA/CA)

## (undated) DEVICE — SUTURE 3-0 ETHILON FS-1 - (36/BX) 30 INCH

## (undated) DEVICE — GLOVE BIOGEL SZ 8 SURGICAL PF LTX - (50PR/BX 4BX/CA)

## (undated) DEVICE — WATER IRRIGATION STERILE 1000ML (12EA/CA)

## (undated) DEVICE — DRESSING ABDOMINAL PAD STERILE 8 X 10" (360EA/CA)"

## (undated) DEVICE — MASK AIRWAY FLEXIBLE SINGLE-USE SIZE 4 ADULTS (10EA/BX)